# Patient Record
Sex: FEMALE | Race: BLACK OR AFRICAN AMERICAN | Employment: FULL TIME | URBAN - METROPOLITAN AREA
[De-identification: names, ages, dates, MRNs, and addresses within clinical notes are randomized per-mention and may not be internally consistent; named-entity substitution may affect disease eponyms.]

---

## 2017-09-25 ENCOUNTER — OFFICE VISIT (OUTPATIENT)
Dept: OBGYN CLINIC | Age: 42
End: 2017-09-25
Payer: COMMERCIAL

## 2017-09-25 VITALS
DIASTOLIC BLOOD PRESSURE: 90 MMHG | BODY MASS INDEX: 39.93 KG/M2 | WEIGHT: 217 LBS | SYSTOLIC BLOOD PRESSURE: 131 MMHG | HEART RATE: 66 BPM | HEIGHT: 62 IN

## 2017-09-25 DIAGNOSIS — O09.521 AMA (ADVANCED MATERNAL AGE) MULTIGRAVIDA 35+, FIRST TRIMESTER: ICD-10-CM

## 2017-09-25 DIAGNOSIS — N91.2 AMENORRHEA: Primary | ICD-10-CM

## 2017-09-25 DIAGNOSIS — O09.291 HISTORY OF STILLBIRTH IN CURRENTLY PREGNANT PATIENT, FIRST TRIMESTER: ICD-10-CM

## 2017-09-25 DIAGNOSIS — Z86.32 HX OF GESTATIONAL DIABETES IN PRIOR PREGNANCY, CURRENTLY PREGNANT, FIRST TRIMESTER: ICD-10-CM

## 2017-09-25 DIAGNOSIS — Z98.891 HX OF CESAREAN SECTION: ICD-10-CM

## 2017-09-25 DIAGNOSIS — O16.9 HYPERTENSION AFFECTING PREGNANCY, UNSPECIFIED TRIMESTER: ICD-10-CM

## 2017-09-25 DIAGNOSIS — O09.291 HX OF GESTATIONAL DIABETES IN PRIOR PREGNANCY, CURRENTLY PREGNANT, FIRST TRIMESTER: ICD-10-CM

## 2017-09-25 PROBLEM — O09.299 HX OF GESTATIONAL DIABETES IN PRIOR PREGNANCY, CURRENTLY PREGNANT: Status: ACTIVE | Noted: 2017-09-25

## 2017-09-25 PROBLEM — O09.299 HISTORY OF STILLBIRTH IN CURRENTLY PREGNANT PATIENT: Status: ACTIVE | Noted: 2017-09-25

## 2017-09-25 LAB
CONTROL: POSITIVE
PREGNANCY TEST URINE, POC: POSITIVE

## 2017-09-25 PROCEDURE — 99213 OFFICE O/P EST LOW 20 MIN: CPT | Performed by: ADVANCED PRACTICE MIDWIFE

## 2017-09-25 PROCEDURE — 81025 URINE PREGNANCY TEST: CPT | Performed by: ADVANCED PRACTICE MIDWIFE

## 2017-09-25 RX ORDER — BISOPROLOL FUMARATE AND HYDROCHLOROTHIAZIDE 10; 6.25 MG/1; MG/1
1 TABLET ORAL DAILY
COMMUNITY
Start: 2017-08-26

## 2017-09-25 RX ORDER — LOSARTAN POTASSIUM AND HYDROCHLOROTHIAZIDE 12.5; 1 MG/1; MG/1
1 TABLET ORAL DAILY
COMMUNITY
Start: 2017-08-26

## 2017-09-28 ENCOUNTER — TELEPHONE (OUTPATIENT)
Dept: OBGYN CLINIC | Age: 42
End: 2017-09-28

## 2017-09-28 ENCOUNTER — HOSPITAL ENCOUNTER (OUTPATIENT)
Dept: ULTRASOUND IMAGING | Age: 42
Discharge: HOME | End: 2017-09-28
Payer: COMMERCIAL

## 2017-09-28 DIAGNOSIS — O20.9 BLEEDING IN EARLY PREGNANCY: Primary | ICD-10-CM

## 2017-09-28 DIAGNOSIS — O20.9 BLEEDING IN EARLY PREGNANCY: ICD-10-CM

## 2017-09-28 PROCEDURE — 76801 OB US < 14 WKS SINGLE FETUS: CPT

## 2017-09-28 PROCEDURE — 76817 TRANSVAGINAL US OBSTETRIC: CPT

## 2017-10-05 ENCOUNTER — OFFICE VISIT (OUTPATIENT)
Dept: OBGYN CLINIC | Age: 42
End: 2017-10-05
Payer: COMMERCIAL

## 2017-10-05 ENCOUNTER — HOSPITAL ENCOUNTER (OUTPATIENT)
Age: 42
Setting detail: SPECIMEN
Discharge: HOME | End: 2017-10-05
Payer: COMMERCIAL

## 2017-10-05 ENCOUNTER — HOSPITAL ENCOUNTER (OUTPATIENT)
Dept: ULTRASOUND IMAGING | Facility: CLINIC | Age: 42
Discharge: HOME | End: 2017-10-05
Payer: COMMERCIAL

## 2017-10-05 VITALS — SYSTOLIC BLOOD PRESSURE: 102 MMHG | DIASTOLIC BLOOD PRESSURE: 56 MMHG | HEART RATE: 67 BPM

## 2017-10-05 DIAGNOSIS — O03.9 SAB (SPONTANEOUS ABORTION): ICD-10-CM

## 2017-10-05 DIAGNOSIS — O03.9 MISCARRIAGE: Primary | ICD-10-CM

## 2017-10-05 DIAGNOSIS — O03.9 SAB (SPONTANEOUS ABORTION): Primary | ICD-10-CM

## 2017-10-05 LAB
ABO/RH: NORMAL
ANTIBODY SCREEN: NEGATIVE
HCG QUANTITATIVE: 156 IU/L

## 2017-10-05 PROCEDURE — 99213 OFFICE O/P EST LOW 20 MIN: CPT | Performed by: OBSTETRICS & GYNECOLOGY

## 2017-10-05 PROCEDURE — 76856 US EXAM PELVIC COMPLETE: CPT

## 2017-10-05 PROCEDURE — 76830 TRANSVAGINAL US NON-OB: CPT

## 2017-10-12 ENCOUNTER — HOSPITAL ENCOUNTER (OUTPATIENT)
Age: 42
Setting detail: SPECIMEN
Discharge: HOME | End: 2017-10-12
Payer: COMMERCIAL

## 2017-10-12 DIAGNOSIS — O03.9 MISCARRIAGE: ICD-10-CM

## 2017-10-12 LAB — HCG QUANTITATIVE: 7 IU/L

## 2019-12-06 ENCOUNTER — HOSPITAL ENCOUNTER (OUTPATIENT)
Dept: ULTRASOUND IMAGING | Age: 44
Discharge: HOME | End: 2019-12-08
Payer: COMMERCIAL

## 2019-12-06 DIAGNOSIS — N92.6 IRREGULAR MENSES: ICD-10-CM

## 2019-12-06 PROCEDURE — 76830 TRANSVAGINAL US NON-OB: CPT

## 2019-12-06 PROCEDURE — 76856 US EXAM PELVIC COMPLETE: CPT

## 2019-12-16 ENCOUNTER — OFFICE VISIT (OUTPATIENT)
Dept: OBGYN CLINIC | Age: 44
End: 2019-12-16
Payer: COMMERCIAL

## 2019-12-16 VITALS
WEIGHT: 217 LBS | DIASTOLIC BLOOD PRESSURE: 82 MMHG | BODY MASS INDEX: 39.93 KG/M2 | HEIGHT: 62 IN | SYSTOLIC BLOOD PRESSURE: 132 MMHG

## 2019-12-16 DIAGNOSIS — N93.9 ABNORMAL UTERINE BLEEDING: Primary | ICD-10-CM

## 2019-12-16 PROCEDURE — G8417 CALC BMI ABV UP PARAM F/U: HCPCS | Performed by: OBSTETRICS & GYNECOLOGY

## 2019-12-16 PROCEDURE — G8428 CUR MEDS NOT DOCUMENT: HCPCS | Performed by: OBSTETRICS & GYNECOLOGY

## 2019-12-16 PROCEDURE — 99214 OFFICE O/P EST MOD 30 MIN: CPT | Performed by: OBSTETRICS & GYNECOLOGY

## 2019-12-16 PROCEDURE — 1036F TOBACCO NON-USER: CPT | Performed by: OBSTETRICS & GYNECOLOGY

## 2019-12-16 PROCEDURE — G8484 FLU IMMUNIZE NO ADMIN: HCPCS | Performed by: OBSTETRICS & GYNECOLOGY

## 2019-12-16 RX ORDER — DICLOFENAC SODIUM 75 MG/1
TABLET, DELAYED RELEASE ORAL
COMMUNITY
End: 2019-12-20 | Stop reason: ALTCHOICE

## 2019-12-27 ENCOUNTER — ANESTHESIA (OUTPATIENT)
Dept: OPERATING ROOM | Age: 44
End: 2019-12-27
Payer: COMMERCIAL

## 2019-12-27 ENCOUNTER — ANESTHESIA EVENT (OUTPATIENT)
Dept: OPERATING ROOM | Age: 44
End: 2019-12-27
Payer: COMMERCIAL

## 2019-12-27 ENCOUNTER — HOSPITAL ENCOUNTER (OUTPATIENT)
Age: 44
Setting detail: OUTPATIENT SURGERY
Discharge: HOME | End: 2019-12-27
Attending: OBSTETRICS & GYNECOLOGY | Admitting: OBSTETRICS & GYNECOLOGY
Payer: COMMERCIAL

## 2019-12-27 VITALS
SYSTOLIC BLOOD PRESSURE: 131 MMHG | OXYGEN SATURATION: 97 % | HEART RATE: 73 BPM | RESPIRATION RATE: 14 BRPM | DIASTOLIC BLOOD PRESSURE: 83 MMHG | BODY MASS INDEX: 41.3 KG/M2 | HEIGHT: 62 IN | WEIGHT: 224.43 LBS | TEMPERATURE: 97.3 F

## 2019-12-27 VITALS — SYSTOLIC BLOOD PRESSURE: 119 MMHG | DIASTOLIC BLOOD PRESSURE: 103 MMHG | TEMPERATURE: 97.3 F | OXYGEN SATURATION: 97 %

## 2019-12-27 LAB
ABO/RH: NORMAL
ANION GAP SERPL CALCULATED.3IONS-SCNC: 15 MMOL/L (ref 9–17)
ANTIBODY SCREEN: NEGATIVE
ARM BAND NUMBER: NORMAL
BUN BLDV-MCNC: 17 MG/DL (ref 6–20)
BUN/CREAT BLD: ABNORMAL (ref 9–20)
CALCIUM SERPL-MCNC: 9 MG/DL (ref 8.6–10.4)
CHLORIDE BLD-SCNC: 103 MMOL/L (ref 98–107)
CO2: 19 MMOL/L (ref 20–31)
CREAT SERPL-MCNC: 0.71 MG/DL (ref 0.5–0.9)
EXPIRATION DATE: NORMAL
GFR AFRICAN AMERICAN: >60 ML/MIN
GFR NON-AFRICAN AMERICAN: >60 ML/MIN
GFR SERPL CREATININE-BSD FRML MDRD: ABNORMAL ML/MIN/{1.73_M2}
GFR SERPL CREATININE-BSD FRML MDRD: ABNORMAL ML/MIN/{1.73_M2}
GLUCOSE BLD-MCNC: 125 MG/DL (ref 70–99)
HCG QUALITATIVE: NEGATIVE
HCG, PREGNANCY URINE (POC): NEGATIVE
HCT VFR BLD CALC: 36.8 % (ref 36.3–47.1)
HEMOGLOBIN: 11.7 G/DL (ref 11.9–15.1)
MCH RBC QN AUTO: 29.6 PG (ref 25.2–33.5)
MCHC RBC AUTO-ENTMCNC: 31.8 G/DL (ref 28.4–34.8)
MCV RBC AUTO: 93.2 FL (ref 82.6–102.9)
NRBC AUTOMATED: 0 PER 100 WBC
PDW BLD-RTO: 17.2 % (ref 11.8–14.4)
PLATELET # BLD: 438 K/UL (ref 138–453)
PMV BLD AUTO: 9.3 FL (ref 8.1–13.5)
POTASSIUM SERPL-SCNC: 3.7 MMOL/L (ref 3.7–5.3)
RBC # BLD: 3.95 M/UL (ref 3.95–5.11)
SODIUM BLD-SCNC: 137 MMOL/L (ref 135–144)
WBC # BLD: 10.6 K/UL (ref 3.5–11.3)

## 2019-12-27 PROCEDURE — 7100000000 HC PACU RECOVERY - FIRST 15 MIN: Performed by: OBSTETRICS & GYNECOLOGY

## 2019-12-27 PROCEDURE — 86901 BLOOD TYPING SEROLOGIC RH(D): CPT

## 2019-12-27 PROCEDURE — 2709999900 HC NON-CHARGEABLE SUPPLY: Performed by: OBSTETRICS & GYNECOLOGY

## 2019-12-27 PROCEDURE — 80048 BASIC METABOLIC PNL TOTAL CA: CPT

## 2019-12-27 PROCEDURE — 2500000003 HC RX 250 WO HCPCS: Performed by: NURSE ANESTHETIST, CERTIFIED REGISTERED

## 2019-12-27 PROCEDURE — 6360000002 HC RX W HCPCS: Performed by: NURSE ANESTHETIST, CERTIFIED REGISTERED

## 2019-12-27 PROCEDURE — 2580000003 HC RX 258: Performed by: NURSE ANESTHETIST, CERTIFIED REGISTERED

## 2019-12-27 PROCEDURE — 3700000000 HC ANESTHESIA ATTENDED CARE: Performed by: OBSTETRICS & GYNECOLOGY

## 2019-12-27 PROCEDURE — 84703 CHORIONIC GONADOTROPIN ASSAY: CPT

## 2019-12-27 PROCEDURE — 86900 BLOOD TYPING SEROLOGIC ABO: CPT

## 2019-12-27 PROCEDURE — 2580000003 HC RX 258: Performed by: ANESTHESIOLOGY

## 2019-12-27 PROCEDURE — 3600000004 HC SURGERY LEVEL 4 BASE: Performed by: OBSTETRICS & GYNECOLOGY

## 2019-12-27 PROCEDURE — 81025 URINE PREGNANCY TEST: CPT

## 2019-12-27 PROCEDURE — 86850 RBC ANTIBODY SCREEN: CPT

## 2019-12-27 PROCEDURE — 6360000002 HC RX W HCPCS: Performed by: ANESTHESIOLOGY

## 2019-12-27 PROCEDURE — 2720000010 HC SURG SUPPLY STERILE: Performed by: OBSTETRICS & GYNECOLOGY

## 2019-12-27 PROCEDURE — 88305 TISSUE EXAM BY PATHOLOGIST: CPT

## 2019-12-27 PROCEDURE — 7100000010 HC PHASE II RECOVERY - FIRST 15 MIN: Performed by: OBSTETRICS & GYNECOLOGY

## 2019-12-27 PROCEDURE — 93005 ELECTROCARDIOGRAM TRACING: CPT | Performed by: ANESTHESIOLOGY

## 2019-12-27 PROCEDURE — 7100000011 HC PHASE II RECOVERY - ADDTL 15 MIN: Performed by: OBSTETRICS & GYNECOLOGY

## 2019-12-27 PROCEDURE — 2580000003 HC RX 258: Performed by: OBSTETRICS & GYNECOLOGY

## 2019-12-27 PROCEDURE — 58563 HYSTEROSCOPY ABLATION: CPT | Performed by: OBSTETRICS & GYNECOLOGY

## 2019-12-27 PROCEDURE — 3700000001 HC ADD 15 MINUTES (ANESTHESIA): Performed by: OBSTETRICS & GYNECOLOGY

## 2019-12-27 PROCEDURE — 85027 COMPLETE CBC AUTOMATED: CPT

## 2019-12-27 PROCEDURE — 3600000014 HC SURGERY LEVEL 4 ADDTL 15MIN: Performed by: OBSTETRICS & GYNECOLOGY

## 2019-12-27 PROCEDURE — 7100000001 HC PACU RECOVERY - ADDTL 15 MIN: Performed by: OBSTETRICS & GYNECOLOGY

## 2019-12-27 RX ORDER — MAGNESIUM HYDROXIDE 1200 MG/15ML
LIQUID ORAL CONTINUOUS PRN
Status: COMPLETED | OUTPATIENT
Start: 2019-12-27 | End: 2019-12-27

## 2019-12-27 RX ORDER — ONDANSETRON 2 MG/ML
INJECTION INTRAMUSCULAR; INTRAVENOUS PRN
Status: DISCONTINUED | OUTPATIENT
Start: 2019-12-27 | End: 2019-12-27 | Stop reason: SDUPTHER

## 2019-12-27 RX ORDER — MIDAZOLAM HYDROCHLORIDE 1 MG/ML
2 INJECTION INTRAMUSCULAR; INTRAVENOUS
Status: DISCONTINUED | OUTPATIENT
Start: 2019-12-27 | End: 2019-12-27 | Stop reason: HOSPADM

## 2019-12-27 RX ORDER — ONDANSETRON 2 MG/ML
4 INJECTION INTRAMUSCULAR; INTRAVENOUS
Status: DISCONTINUED | OUTPATIENT
Start: 2019-12-27 | End: 2019-12-27 | Stop reason: HOSPADM

## 2019-12-27 RX ORDER — HYDROCODONE BITARTRATE AND ACETAMINOPHEN 5; 325 MG/1; MG/1
2 TABLET ORAL PRN
Status: DISCONTINUED | OUTPATIENT
Start: 2019-12-27 | End: 2019-12-27 | Stop reason: HOSPADM

## 2019-12-27 RX ORDER — MIDAZOLAM HYDROCHLORIDE 1 MG/ML
INJECTION INTRAMUSCULAR; INTRAVENOUS PRN
Status: DISCONTINUED | OUTPATIENT
Start: 2019-12-27 | End: 2019-12-27 | Stop reason: SDUPTHER

## 2019-12-27 RX ORDER — PROPOFOL 10 MG/ML
INJECTION, EMULSION INTRAVENOUS PRN
Status: DISCONTINUED | OUTPATIENT
Start: 2019-12-27 | End: 2019-12-27 | Stop reason: SDUPTHER

## 2019-12-27 RX ORDER — FENTANYL CITRATE 50 UG/ML
25 INJECTION, SOLUTION INTRAMUSCULAR; INTRAVENOUS EVERY 5 MIN PRN
Status: DISCONTINUED | OUTPATIENT
Start: 2019-12-27 | End: 2019-12-27 | Stop reason: HOSPADM

## 2019-12-27 RX ORDER — DEXAMETHASONE SODIUM PHOSPHATE 10 MG/ML
INJECTION INTRAMUSCULAR; INTRAVENOUS PRN
Status: DISCONTINUED | OUTPATIENT
Start: 2019-12-27 | End: 2019-12-27 | Stop reason: SDUPTHER

## 2019-12-27 RX ORDER — FENTANYL CITRATE 50 UG/ML
INJECTION, SOLUTION INTRAMUSCULAR; INTRAVENOUS PRN
Status: DISCONTINUED | OUTPATIENT
Start: 2019-12-27 | End: 2019-12-27 | Stop reason: SDUPTHER

## 2019-12-27 RX ORDER — IBUPROFEN 800 MG/1
800 TABLET ORAL EVERY 8 HOURS PRN
Qty: 60 TABLET | Refills: 0 | Status: SHIPPED | OUTPATIENT
Start: 2019-12-27

## 2019-12-27 RX ORDER — DIPHENHYDRAMINE HYDROCHLORIDE 50 MG/ML
INJECTION INTRAMUSCULAR; INTRAVENOUS PRN
Status: DISCONTINUED | OUTPATIENT
Start: 2019-12-27 | End: 2019-12-27 | Stop reason: SDUPTHER

## 2019-12-27 RX ORDER — LIDOCAINE HYDROCHLORIDE 10 MG/ML
INJECTION, SOLUTION EPIDURAL; INFILTRATION; INTRACAUDAL; PERINEURAL PRN
Status: DISCONTINUED | OUTPATIENT
Start: 2019-12-27 | End: 2019-12-27 | Stop reason: SDUPTHER

## 2019-12-27 RX ORDER — KETOROLAC TROMETHAMINE 30 MG/ML
INJECTION, SOLUTION INTRAMUSCULAR; INTRAVENOUS PRN
Status: DISCONTINUED | OUTPATIENT
Start: 2019-12-27 | End: 2019-12-27 | Stop reason: SDUPTHER

## 2019-12-27 RX ORDER — LIDOCAINE HYDROCHLORIDE 10 MG/ML
1 INJECTION, SOLUTION EPIDURAL; INFILTRATION; INTRACAUDAL; PERINEURAL
Status: DISCONTINUED | OUTPATIENT
Start: 2019-12-27 | End: 2019-12-27 | Stop reason: HOSPADM

## 2019-12-27 RX ORDER — SODIUM CHLORIDE, SODIUM LACTATE, POTASSIUM CHLORIDE, CALCIUM CHLORIDE 600; 310; 30; 20 MG/100ML; MG/100ML; MG/100ML; MG/100ML
INJECTION, SOLUTION INTRAVENOUS CONTINUOUS PRN
Status: DISCONTINUED | OUTPATIENT
Start: 2019-12-27 | End: 2019-12-27 | Stop reason: SDUPTHER

## 2019-12-27 RX ORDER — LABETALOL HYDROCHLORIDE 5 MG/ML
5 INJECTION, SOLUTION INTRAVENOUS EVERY 10 MIN PRN
Status: DISCONTINUED | OUTPATIENT
Start: 2019-12-27 | End: 2019-12-27 | Stop reason: HOSPADM

## 2019-12-27 RX ORDER — HYDROCODONE BITARTRATE AND ACETAMINOPHEN 5; 325 MG/1; MG/1
1 TABLET ORAL PRN
Status: DISCONTINUED | OUTPATIENT
Start: 2019-12-27 | End: 2019-12-27 | Stop reason: HOSPADM

## 2019-12-27 RX ORDER — SODIUM CHLORIDE, SODIUM LACTATE, POTASSIUM CHLORIDE, CALCIUM CHLORIDE 600; 310; 30; 20 MG/100ML; MG/100ML; MG/100ML; MG/100ML
INJECTION, SOLUTION INTRAVENOUS CONTINUOUS
Status: DISCONTINUED | OUTPATIENT
Start: 2019-12-27 | End: 2019-12-27 | Stop reason: HOSPADM

## 2019-12-27 RX ADMIN — FENTANYL CITRATE 25 MCG: 50 INJECTION INTRAMUSCULAR; INTRAVENOUS at 10:13

## 2019-12-27 RX ADMIN — FENTANYL CITRATE 25 MCG: 50 INJECTION INTRAMUSCULAR; INTRAVENOUS at 10:16

## 2019-12-27 RX ADMIN — FENTANYL CITRATE 25 MCG: 50 INJECTION, SOLUTION INTRAMUSCULAR; INTRAVENOUS at 11:12

## 2019-12-27 RX ADMIN — Medication 12.5 MG: at 10:15

## 2019-12-27 RX ADMIN — LIDOCAINE HYDROCHLORIDE 50 MG: 10 INJECTION, SOLUTION EPIDURAL; INFILTRATION; INTRACAUDAL; PERINEURAL at 10:07

## 2019-12-27 RX ADMIN — ONDANSETRON 4 MG: 2 INJECTION, SOLUTION INTRAMUSCULAR; INTRAVENOUS at 10:36

## 2019-12-27 RX ADMIN — DEXAMETHASONE SODIUM PHOSPHATE 10 MG: 10 INJECTION INTRAMUSCULAR; INTRAVENOUS at 10:15

## 2019-12-27 RX ADMIN — KETOROLAC TROMETHAMINE 30 MG: 30 INJECTION, SOLUTION INTRAMUSCULAR; INTRAVENOUS at 10:36

## 2019-12-27 RX ADMIN — FENTANYL CITRATE 25 MCG: 50 INJECTION INTRAMUSCULAR; INTRAVENOUS at 10:18

## 2019-12-27 RX ADMIN — FENTANYL CITRATE 25 MCG: 50 INJECTION INTRAMUSCULAR; INTRAVENOUS at 10:11

## 2019-12-27 RX ADMIN — FENTANYL CITRATE 25 MCG: 50 INJECTION, SOLUTION INTRAMUSCULAR; INTRAVENOUS at 11:17

## 2019-12-27 RX ADMIN — SODIUM CHLORIDE, POTASSIUM CHLORIDE, SODIUM LACTATE AND CALCIUM CHLORIDE: 600; 310; 30; 20 INJECTION, SOLUTION INTRAVENOUS at 09:15

## 2019-12-27 RX ADMIN — PROPOFOL 200 MG: 10 INJECTION, EMULSION INTRAVENOUS at 10:07

## 2019-12-27 RX ADMIN — MIDAZOLAM HYDROCHLORIDE 2 MG: 1 INJECTION, SOLUTION INTRAMUSCULAR; INTRAVENOUS at 10:03

## 2019-12-27 RX ADMIN — SODIUM CHLORIDE, POTASSIUM CHLORIDE, SODIUM LACTATE AND CALCIUM CHLORIDE: 600; 310; 30; 20 INJECTION, SOLUTION INTRAVENOUS at 09:58

## 2019-12-27 ASSESSMENT — PULMONARY FUNCTION TESTS
PIF_VALUE: 1
PIF_VALUE: 23
PIF_VALUE: 0
PIF_VALUE: 24
PIF_VALUE: 20
PIF_VALUE: 24
PIF_VALUE: 23
PIF_VALUE: 24
PIF_VALUE: 23
PIF_VALUE: 1
PIF_VALUE: 23
PIF_VALUE: 20
PIF_VALUE: 21
PIF_VALUE: 23
PIF_VALUE: 3
PIF_VALUE: 23
PIF_VALUE: 5
PIF_VALUE: 24
PIF_VALUE: 22
PIF_VALUE: 23
PIF_VALUE: 23
PIF_VALUE: 17
PIF_VALUE: 23
PIF_VALUE: 23
PIF_VALUE: 7
PIF_VALUE: 21
PIF_VALUE: 22
PIF_VALUE: 24
PIF_VALUE: 21
PIF_VALUE: 21
PIF_VALUE: 0
PIF_VALUE: 3
PIF_VALUE: 23
PIF_VALUE: 21
PIF_VALUE: 1
PIF_VALUE: 24
PIF_VALUE: 21
PIF_VALUE: 13
PIF_VALUE: 21
PIF_VALUE: 23
PIF_VALUE: 8
PIF_VALUE: 2
PIF_VALUE: 16
PIF_VALUE: 23
PIF_VALUE: 1
PIF_VALUE: 1

## 2019-12-27 ASSESSMENT — PAIN SCALES - GENERAL
PAINLEVEL_OUTOF10: 2
PAINLEVEL_OUTOF10: 6
PAINLEVEL_OUTOF10: 7
PAINLEVEL_OUTOF10: 4
PAINLEVEL_OUTOF10: 2
PAINLEVEL_OUTOF10: 2

## 2019-12-27 ASSESSMENT — PAIN DESCRIPTION - PAIN TYPE: TYPE: SURGICAL PAIN

## 2019-12-27 ASSESSMENT — PAIN DESCRIPTION - LOCATION: LOCATION: ABDOMEN

## 2019-12-27 ASSESSMENT — PAIN DESCRIPTION - ORIENTATION: ORIENTATION: LOWER

## 2019-12-27 ASSESSMENT — PAIN DESCRIPTION - DESCRIPTORS: DESCRIPTORS: CRAMPING

## 2019-12-27 ASSESSMENT — PAIN - FUNCTIONAL ASSESSMENT: PAIN_FUNCTIONAL_ASSESSMENT: 0-10

## 2019-12-27 NOTE — H&P
necessity for a second surgery and that there may be an incomplete removal of abnormal tissue.     Dorothea Hassan DO  Ob/Gyn Resident  Pager: [phone]  Parminder Correia Se  12/27/2019 9:36 AM

## 2019-12-27 NOTE — H&P
HPI :   Clint Villegas is a 38 y.o. femaleGRAVIDA 6 PARA 2042     Clint Villegas was seen in the office as a referral from her PCP with complaints of abnormal bleeding for the past 6 months. She stated that bleeding varies in amount and are associated with moderately severe cramping. She does take NSAIDs for this. Prior to this she was having regular menstrual cycles. Her PCP did order a pelvic ultrasound. She is having regular bowel movements without constipation or diarrhea. She denies any involuntary loss of urine.       EXAMINATION:   PELVIC ULTRASOUND       12/6/2019       TECHNIQUE:   Transabdominal and transvaginal pelvic ultrasound was performed with color   doppler flow evaluation without spectral waveform analysis.       COMPARISON:   10/05/2017       HISTORY:   ORDERING SYSTEM PROVIDED HISTORY: Irregular menses       41-year-old female with irregular menses       FINDINGS:       Measurements:       Uterus:  9.0 x 4.9 x 5.0 cm       Endometrial stripe:  2.5 cm       Right Ovary:  3.0 x 1.9 x 1.3 cm       Left Ovary:  3.1 x 3.0 x 2.3 cm           Ultrasound Findings:       Patient's LMP is 11/26/2019.       Uterus: Heterogeneous echogenicity of the uterus.       Endometrial stripe: Endometrial stripe is abnormally enlarged/thickened. Nabothian cysts in the cervix.       Right Ovary: Right ovary is within normal limits.       Left Ovary:  Left ovary is within normal limits.       Bilateral ovarian follicles.       Free Fluid: No evidence of free fluid.           Impression   1. Abnormal thickening of the endometrial stripe thickness measuring 2.5 cm. Differential considerations include endometrial polyps, endometrial   hyperplasia or endometrial carcinoma.  Consider further evaluation with   endometrial sampling if clinically indicated. 2. Bilateral ovarian follicles.    3. Nabothian cysts in the cervix.         _____________________________________________________________________       Past Medical History:   Diagnosis Date    Hypertension                                                                           Past Surgical History:   Procedure Laterality Date    APPENDECTOMY         SECTION        DILATION AND CURETTAGE                Family History   Problem Relation Age of Onset    Cancer Paternal Grandmother      Hypertension Maternal Grandmother      Heart Disease Maternal Grandmother      Hypertension Mother      Heart Disease Mother        Social History           Tobacco Use   Smoking Status Former Smoker    Packs/day: 1.00    Years: 17.00    Pack years: 17.00    Types: Cigarettes   Smokeless Tobacco Never Used      Social History           Substance and Sexual Activity   Alcohol Use No     Comment: not currently             Current Outpatient Medications   Medication Sig Dispense Refill    diclofenac (VOLTAREN) 75 MG EC tablet 1 tablet        losartan-hydrochlorothiazide (HYZAAR) 100-12.5 MG per tablet Take 1 tablet by mouth daily        bisoprolol-hydrochlorothiazide (ZIAC) 10-6.25 MG per tablet Take 1 tablet by mouth daily        Prenatal Vit-Fe Fumarate-FA (PRENATAL MULTIVITAMIN/IRON PO) Take 1 tablet by mouth daily          No current facility-administered medications for this visit.          Allergies: Allergies   Allergen Reactions    Other Rash       There is a medication/antibiotic that the patient took \"a long time ago\", but she cannot remember what it was or what it was prescribed for. Gave her a rash on her face         Gynecologic History:  Patient's last menstrual period was 2019.   Sexually Active: Yes  STD History: Yes, CT/trichomonas  Abnormal Pap History no  Birth Control: No              OB History    Para Term  AB Living   6 3 0 0 1 2   SAB TAB Ectopic Molar Multiple Live Births    0 0 0 0 0 2   Obstetric Comments   Still born birth      ______________________________________________________________________  REVIEW OF has been signed for a hysteroscopy, D&C and endometrial ablation   . Preop labs will include a CBC, type & screen, HCG and BMP. We will not require antibiotic prophylaxis and will use SCDs for VTE prophylaxis. She was instructed not to use NSAIDs regularly within 4-5 days of her planned surgery. She will plan on returning to the office in 1-2 weeks postop.     Danielle Perla MD, MPH, [facility]  [email or web address]

## 2019-12-28 LAB
EKG ATRIAL RATE: 78 BPM
EKG P AXIS: 45 DEGREES
EKG P-R INTERVAL: 148 MS
EKG Q-T INTERVAL: 400 MS
EKG QRS DURATION: 82 MS
EKG QTC CALCULATION (BAZETT): 456 MS
EKG R AXIS: 22 DEGREES
EKG T AXIS: 12 DEGREES
EKG VENTRICULAR RATE: 78 BPM

## 2019-12-30 LAB — SURGICAL PATHOLOGY REPORT: NORMAL

## 2020-01-06 ENCOUNTER — OFFICE VISIT (OUTPATIENT)
Dept: OBGYN CLINIC | Age: 45
End: 2020-01-06
Payer: COMMERCIAL

## 2020-01-06 VITALS
SYSTOLIC BLOOD PRESSURE: 118 MMHG | DIASTOLIC BLOOD PRESSURE: 72 MMHG | HEIGHT: 62 IN | BODY MASS INDEX: 42.69 KG/M2 | WEIGHT: 232 LBS

## 2020-01-06 PROCEDURE — G8417 CALC BMI ABV UP PARAM F/U: HCPCS | Performed by: OBSTETRICS & GYNECOLOGY

## 2020-01-06 PROCEDURE — 4004F PT TOBACCO SCREEN RCVD TLK: CPT | Performed by: OBSTETRICS & GYNECOLOGY

## 2020-01-06 PROCEDURE — G8427 DOCREV CUR MEDS BY ELIG CLIN: HCPCS | Performed by: OBSTETRICS & GYNECOLOGY

## 2020-01-06 PROCEDURE — 99213 OFFICE O/P EST LOW 20 MIN: CPT | Performed by: OBSTETRICS & GYNECOLOGY

## 2020-01-06 PROCEDURE — G8484 FLU IMMUNIZE NO ADMIN: HCPCS | Performed by: OBSTETRICS & GYNECOLOGY

## 2020-09-10 ENCOUNTER — HOSPITAL ENCOUNTER (OUTPATIENT)
Facility: CLINIC | Age: 45
Discharge: HOME | End: 2020-09-10
Payer: COMMERCIAL

## 2020-09-10 LAB
CHOLESTEROL/HDL RATIO: 4.5
CHOLESTEROL: 183 MG/DL
HDLC SERPL-MCNC: 41 MG/DL
LDL CHOLESTEROL: 92 MG/DL (ref 0–130)
TRIGL SERPL-MCNC: 250 MG/DL
VLDLC SERPL CALC-MCNC: ABNORMAL MG/DL (ref 1–30)

## 2020-09-10 PROCEDURE — 36415 COLL VENOUS BLD VENIPUNCTURE: CPT

## 2020-09-10 PROCEDURE — 80061 LIPID PANEL: CPT

## 2021-04-13 ENCOUNTER — HOSPITAL ENCOUNTER (OUTPATIENT)
Facility: CLINIC | Age: 46
Discharge: HOME | End: 2021-04-13
Payer: COMMERCIAL

## 2021-04-13 LAB
CHOLESTEROL, FASTING: 187 MG/DL
CHOLESTEROL/HDL RATIO: 3.8
HDLC SERPL-MCNC: 49 MG/DL
LDL CHOLESTEROL: 98 MG/DL (ref 0–130)
TRIGLYCERIDE, FASTING: 202 MG/DL
VLDLC SERPL CALC-MCNC: ABNORMAL MG/DL (ref 1–30)

## 2021-04-13 PROCEDURE — 36415 COLL VENOUS BLD VENIPUNCTURE: CPT

## 2021-04-13 PROCEDURE — 80061 LIPID PANEL: CPT

## 2021-04-27 ENCOUNTER — OFFICE VISIT (OUTPATIENT)
Dept: OBGYN CLINIC | Age: 46
End: 2021-04-27
Payer: COMMERCIAL

## 2021-04-27 ENCOUNTER — HOSPITAL ENCOUNTER (OUTPATIENT)
Age: 46
Setting detail: SPECIMEN
Discharge: HOME | End: 2021-04-27
Payer: COMMERCIAL

## 2021-04-27 VITALS
WEIGHT: 228 LBS | SYSTOLIC BLOOD PRESSURE: 140 MMHG | HEIGHT: 62 IN | BODY MASS INDEX: 41.96 KG/M2 | DIASTOLIC BLOOD PRESSURE: 78 MMHG

## 2021-04-27 DIAGNOSIS — Z01.419 ENCOUNTER FOR GYNECOLOGICAL EXAMINATION WITHOUT ABNORMAL FINDING: Primary | ICD-10-CM

## 2021-04-27 DIAGNOSIS — Z12.31 VISIT FOR SCREENING MAMMOGRAM: ICD-10-CM

## 2021-04-27 PROCEDURE — G8427 DOCREV CUR MEDS BY ELIG CLIN: HCPCS | Performed by: OBSTETRICS & GYNECOLOGY

## 2021-04-27 PROCEDURE — 4004F PT TOBACCO SCREEN RCVD TLK: CPT | Performed by: OBSTETRICS & GYNECOLOGY

## 2021-04-27 PROCEDURE — G8417 CALC BMI ABV UP PARAM F/U: HCPCS | Performed by: OBSTETRICS & GYNECOLOGY

## 2021-04-27 PROCEDURE — 99396 PREV VISIT EST AGE 40-64: CPT | Performed by: OBSTETRICS & GYNECOLOGY

## 2021-04-27 NOTE — PROGRESS NOTES
DATE OF VISIT:  21        History and Physical    Eliel Glez    :  1975  CHIEF COMPLAINT:    Chief Complaint   Patient presents with    Annual Exam     Here for annual Last pap 19neg Last mamogram 20 neg Had Novasure                     HPI :   Eliel Glez is a 41 y.o. femaleGRAVIDA 6 PARA     Eliel Glez returns today for her annual exam.  Since her ablation she has not been experiencing any vaginal bleeding. She denies any vasomotor symptoms. She sees Dr. Betzaida Zaragoza as her PCP however she will be retiring this spring. She is having regular bowel movements without constipation or diarrhea. She denies any UTI symptoms or involuntary loss of urine. She is otherwise without any significant complaints today. Pavan and her  are planning on getting the Covid vaccination.   _____________________________________________________________________  Past Medical History:   Diagnosis Date    Asthma     exercise induced- not taking inhalers currently    Hypertension     Sleep apnea     diagnosed by dentist does not use a machine    Wellness examination     Dr. Radha Benoit                                                                   Past Surgical History:   Procedure Laterality Date    APPENDECTOMY      [address] AND CURETTAGE OF UTERUS  2019    [address] [facility] [address] AND CURETTAGE OF UTERUS N/A 2019    NOVASURE- DILATATION AND CURETTAGE HYSTEROSCOPY performed by Pj Barr MD at [address] [place] [address] Drive HYSTEROSCOPY  2019     Family History   Problem Relation Age of Onset    Cancer Paternal Grandmother     Hypertension Maternal Grandmother     Heart Disease Maternal Grandmother     Hypertension Mother     Heart Disease Mother     Other Father         suicide    High Blood Pressure Sister     Arthritis Sister     High Blood Pressure Maternal Aunt     High Blood Pressure Maternal Uncle     Stroke Paternal Uncle      Social History     Tobacco Use   Smoking Status Current Every Day Smoker    Packs/day: 0.50    Years: 17.00    Pack years: 8.50    Types: Cigarettes   Smokeless Tobacco Never Used     Social History     Substance and Sexual Activity   Alcohol Use No    Comment: social     Current Outpatient Medications   Medication Sig Dispense Refill    losartan-hydrochlorothiazide (HYZAAR) 100-12.5 MG per tablet Take 1 tablet by mouth daily      bisoprolol-hydrochlorothiazide (ZIAC) 10-6.25 MG per tablet Take 1 tablet by mouth daily      ibuprofen (ADVIL;MOTRIN) 800 MG tablet Take 1 tablet by mouth every 8 hours as needed for Pain or Fever (Patient not taking: Reported on 2021) 60 tablet 0     No current facility-administered medications for this visit. Allergies: Allergies   Allergen Reactions    Other Rash     There is a medication/antibiotic that the patient took \"a long time ago\", but she cannot remember what it was or what it was prescribed for. Gave her a rash on her face       Gynecologic History:  No LMP recorded. Patient has had an ablation.   Sexually Active: Yes  STD History: Yes CT/trichomoniasis  Abnormal Pap History yes  Birth Control: No    OB History    Para Term  AB Living   6 3 0 0 1 2   SAB TAB Ectopic Molar Multiple Live Births   0 0 0 0 0 2   Obstetric Comments   Still born birth     ______________________________________________________________________  REVIEW OF SYSTEMS:        Constitutional:  Unexpected weight change no  Neurological:  Frequent headaches  no  Ophthalmic:  Recent visual changes no  ENT:   Difficulty swallowing  no  Breast:              Masses   no     Respiratory:  Shortness of breath  no    Cardiovascular: Chest pain   no     Gastrointestinal: Chronic diarrhea/constipation no   Urogenital:  Urinary incontinence  no                                         Heavy/irregular periods           no Vaginal discharge                   no  Hematological: Bruises easy   no     Endocrine:  Nipple Discharge  no     Hot/Cold Intolerance  no   Psychological:  Mood and affect were wnl yes                                                                                                                                           Physical Exam:    Vitals:    04/27/21 1037   BP: (!) 140/78   Site: Right Upper Arm   Position: Sitting   Cuff Size: Medium Adult   Weight: 228 lb (103.4 kg)   Height: 5' 2\" (1.575 m)       General Appearance:  She does not appear to be in any distress. This  is a well developed, well nourished, well groomed female. Neurological:  The patient is alert and oriented to time, place, person, and situation without any noted sensory motor deficits. Skin:  A brief inspection of the skin revealed no rashes or lesions. Neck:  The neck was supple. There is no tracheal deviation, thyromegaly or supraclavicular adenopathy appreciated. Breast:   The patients breasts were symmetrical.  Breasts are nontender and there  were no masses, discharge or pathologic skin changes. There is no supraclavicular or axillary adenopathy bilaterally. Respiratory: There was unlabored respiratory effort. Lungs clear to ascultation without wheezes, rales or rhonchi in all fields bilaterally. Cardiovascular:  Normal sinus rhythm with a regular rate and without murmur, rubs or gallops. Abdomen: The abdomen was soft and non-tender with no guarding, rebound, CVAT or rigidity. No hernias were appreciated. Bowel sounds were normally active. Pelvic exam:  No vulvar, vaginal or cervical lesions are noted. Normal vaginal discharge present, no significant cystocele, rectocele or enterocele noted. Uterus nongravid and without CMT and adnexa nontender and without abnormal masses bilaterally. Extremities:  FROM and nontender without clubbing cyanosis or edema. ASSESSMENT:         ICD-10-CM    1. Encounter for gynecological examination without abnormal finding  Z01.419 PAP SMEAR   2. Visit for screening mammogram  Z12.31 GORGE DIGITAL SCREEN W OR WO CAD BILATERAL                   PLAN:    Return to the office in 1 year or when necessary  Patient was seen with total face to face time of 20 minutes. Gabriella Mckeon M.D., Mph  [facility]  [place]

## 2021-04-29 LAB — CYTOLOGY REPORT: NORMAL

## 2022-01-27 DIAGNOSIS — Z12.31 VISIT FOR SCREENING MAMMOGRAM: ICD-10-CM

## 2022-04-22 NOTE — PROGRESS NOTES
The patient was seen today. She is here regarding post ab  . Her bowels are regular and she is voiding without difficulty. HPI: 42yo  who has passed tissue and clot since she was last in. Not interested in pregnancy and  is to get a vasectomy. Currently bleeding and no reporting fevers, chills, or foul odor. Past Medical History:   Diagnosis Date    Hypertension      Past Surgical History:   Procedure Laterality Date    APPENDECTOMY       SECTION      DILATION AND CURETTAGE       REVIEW OF SYSTEMS:        A minimum of an eleven point review of systems was completed and found to be negative except for the pertinent positives found below. Constitutional: No fever, chills or malaise; No weight change or fatigue  Head and Eyes: No vision, Headache, Dizziness or trauma in last 12 months  ENT ROS: No hearing, Tinnitis, sinus or taste problems  Hematological and Lymphatic ROS:No Lymphoma, Von Willebrand's, Hemophillia or Bleeding History  Psych ROS: No Depression, Homicidal thoughts,suicidal thoughts, or anxiety  Breast ROS: No prior breast abnormalities or lumps  Respiratory ROS: No SOB, Pneumoniae,Cough, or Pulmonary Embolism History  Cardiovascular ROS: No Chest Pain with Exertion, Palpitations, Syncope, Edema, Arrhythmia  Gastrointestinal ROS: No Indigestion, Heartburn, Nausea, vomiting, Diahrea, Constipation,or Bowel Changes; No Bloody Stools or melena  Genito-Urinary ROS: No Dysuria, Hematuria or Nocturia. No Urinary Incontinence or Vaginal Discharge  Musculoskeletal ROS: No Arthralgia, Arthritis,Gout,Osteoporosis or Rheumatism  Neurological ROS: No CVA, Migraines, Epilepsy, Seizure Hx, or Limb Weakness  Dermatological ROS: No Rash, Itching, Hives, Mole Changes or Cancer                                            Blood pressure (!) 102/56, pulse 67, last menstrual period 2017, unknown if currently breastfeeding. Abdomen: Soft non-tender; good bowel sounds.  No guarding, rebound
No
pcp

## 2022-09-26 ENCOUNTER — OFFICE VISIT (OUTPATIENT)
Dept: OBGYN CLINIC | Age: 47
End: 2022-09-26
Payer: COMMERCIAL

## 2022-09-26 ENCOUNTER — HOSPITAL ENCOUNTER (OUTPATIENT)
Age: 47
Setting detail: SPECIMEN
Discharge: HOME | End: 2022-09-26

## 2022-09-26 VITALS
SYSTOLIC BLOOD PRESSURE: 130 MMHG | HEIGHT: 62 IN | BODY MASS INDEX: 40.67 KG/M2 | WEIGHT: 221 LBS | DIASTOLIC BLOOD PRESSURE: 82 MMHG

## 2022-09-26 DIAGNOSIS — Z12.31 VISIT FOR SCREENING MAMMOGRAM: ICD-10-CM

## 2022-09-26 DIAGNOSIS — Z01.419 ENCOUNTER FOR GYNECOLOGICAL EXAMINATION WITHOUT ABNORMAL FINDING: Primary | ICD-10-CM

## 2022-09-26 PROCEDURE — 4004F PT TOBACCO SCREEN RCVD TLK: CPT | Performed by: OBSTETRICS & GYNECOLOGY

## 2022-09-26 PROCEDURE — G8427 DOCREV CUR MEDS BY ELIG CLIN: HCPCS | Performed by: OBSTETRICS & GYNECOLOGY

## 2022-09-26 PROCEDURE — G8417 CALC BMI ABV UP PARAM F/U: HCPCS | Performed by: OBSTETRICS & GYNECOLOGY

## 2022-09-26 PROCEDURE — 99396 PREV VISIT EST AGE 40-64: CPT | Performed by: OBSTETRICS & GYNECOLOGY

## 2022-09-26 RX ORDER — METFORMIN HYDROCHLORIDE 500 MG/1
TABLET, EXTENDED RELEASE ORAL
COMMUNITY
Start: 2022-08-03

## 2022-09-26 NOTE — PROGRESS NOTES
DATE OF VISIT:  22  History and Physical    Emily Hamilton    :  1975  CHIEF COMPLAINT:    Chief Complaint   Patient presents with    Annual Exam     Here for annual Last pap 21 neg last mammogram 22 neg                     HPI :   Emily Hamilton is a 56 y.o. femaleGRAVIDA 6 PARA                        Genevieve Delgado returns today for her annual exam.  She was recently started on metformin for prediabetes. She is having regular bowel movements without constipation or diarrhea. She denies any UTI symptoms or involuntary loss of urine. She is otherwise without any significant complaints today. She has received her COVID vacs times 2+ booster. She is due for her mammogram in January. Pavan would like to establish a new PCP here with Dr. Sarah Otto.  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  _____________________________________________________________________  Past Medical History:   Diagnosis Date    Asthma     exercise induced- not taking inhalers currently    Hypertension     Sleep apnea     diagnosed by dentist does not use a machine    Wellness examination     Dr. Nahum Beth                                                                   Past Surgical History:   Procedure Laterality Date    APPENDECTOMY      [address] AND CURETTAGE OF UTERUS  2019    [address] [place] AND CURETTAGE OF UTERUS N/A 2019    NOVASURE- DILATATION AND CURETTAGE HYSTEROSCOPY performed by Jonah Smith MD at [address] [place] [address]  2019     Family History   Problem Relation Age of Onset    Cancer Paternal Grandmother     Hypertension Maternal Grandmother     Heart Disease Maternal Grandmother     Hypertension Mother     Heart Disease Mother     Other Father         suicide    High Blood Pressure Sister     Arthritis Sister     High Blood Pressure Maternal Aunt     High Blood Pressure Maternal Uncle     Stroke Paternal Uncle      Social History     Tobacco Use   Smoking Status Every Day    Packs/day: 0.50    Years: 17.00    Pack years: 8.50    Types: Cigarettes   Smokeless Tobacco Never     Social History     Substance and Sexual Activity   Alcohol Use No    Comment: social     Current Outpatient Medications   Medication Sig Dispense Refill    metFORMIN (GLUCOPHAGE-XR) 500 MG extended release tablet       losartan-hydrochlorothiazide (HYZAAR) 100-12.5 MG per tablet Take 1 tablet by mouth daily      bisoprolol-hydrochlorothiazide (ZIAC) 10-6.25 MG per tablet Take 1 tablet by mouth daily      ibuprofen (ADVIL;MOTRIN) 800 MG tablet Take 1 tablet by mouth every 8 hours as needed for Pain or Fever (Patient not taking: Reported on 2021) 60 tablet 0     No current facility-administered medications for this visit. Allergies: Allergies   Allergen Reactions    Other Rash     There is a medication/antibiotic that the patient took \"a long time ago\", but she cannot remember what it was or what it was prescribed for. Gave her a rash on her face       Gynecologic History:  No LMP recorded. Patient has had an ablation.   Sexually Active: Yes  STD History: Yes CT/TV  Abnormal Pap History yes  Birth Control: No    OB History    Para Term  AB Living   6 3 0 0 1 2   SAB IAB Ectopic Molar Multiple Live Births   0 0 0 0 0 2   Obstetric Comments   Still born birth     ______________________________________________________________________  REVIEW OF SYSTEMS:        Constitutional:  Unexpected weight change no  Neurological:  Frequent headaches  no  Ophthalmic:  Recent visual changes no  ENT:   Difficulty swallowing  no  Breast:              Masses   no     Respiratory:  Shortness of breath  no    Cardiovascular: Chest pain   no     Gastrointestinal: Chronic diarrhea/constipation no   Urogenital:  Urinary incontinence  no Heavy/irregular periods           no                                      Vaginal discharge                   no  Hematological: Bruises easy   no     Endocrine:  Nipple Discharge  no     Hot/Cold Intolerance  no   Psychological:  Mood and affect were wnl yes                                                                                                                                           Physical Exam:    Vitals:    09/26/22 0953   BP: 130/82   Site: Left Upper Arm   Position: Sitting   Cuff Size: Large Adult   Weight: 221 lb (100.2 kg)   Height: 5' 2\" (1.575 m)       General Appearance:  She does not appear to be in any distress. This  is a well developed, well nourished, well groomed female. Neurological:  The patient is alert and oriented to time, place, person, and situation without any noted sensory motor deficits. Skin:  A brief inspection of the skin revealed no rashes or lesions. Neck:  The neck was supple. There is no tracheal deviation, thyromegaly or supraclavicular adenopathy appreciated. Breast:   The patients breasts were symmetrical.  Breasts are nontender and there  were no masses, discharge or pathologic skin changes. There is no supraclavicular or axillary adenopathy bilaterally. Respiratory: There was unlabored respiratory effort. Lungs clear to ascultation without wheezes, rales or rhonchi in all fields bilaterally. Cardiovascular:  Normal sinus rhythm with a regular rate and without murmur, rubs or gallops. Abdomen: The abdomen was soft and non-tender with no guarding, rebound, CVAT or rigidity. No hernias were appreciated. Bowel sounds were normally active. Pelvic exam:  No vulvar, vaginal or cervical lesions are noted. Normal vaginal discharge present, no significant cystocele, rectocele or enterocele noted. Uterus nongravid and without CMT and adnexa nontender and without abnormal masses bilaterally.     Extremities:  FROM and nontender without

## 2022-09-26 NOTE — PATIENT INSTRUCTIONS
You can establish a new primary care visit with Dr. Evaristo Turk. Please get your mammogram done as scheduled in January. We will contact you with that result as well as today's Pap smear. Return to the office in 1 year or as needed. Be safe and have a healthy year!

## 2022-10-06 LAB — CYTOLOGY REPORT: NORMAL

## 2023-02-02 ENCOUNTER — OFFICE VISIT (OUTPATIENT)
Dept: FAMILY MEDICINE CLINIC | Age: 48
End: 2023-02-02
Payer: COMMERCIAL

## 2023-02-02 VITALS
HEART RATE: 86 BPM | TEMPERATURE: 97.6 F | HEIGHT: 62 IN | DIASTOLIC BLOOD PRESSURE: 88 MMHG | SYSTOLIC BLOOD PRESSURE: 138 MMHG | BODY MASS INDEX: 41.11 KG/M2 | WEIGHT: 223.4 LBS | OXYGEN SATURATION: 99 %

## 2023-02-02 DIAGNOSIS — Z11.4 ENCOUNTER FOR SCREENING FOR HIV: ICD-10-CM

## 2023-02-02 DIAGNOSIS — Z76.89 ENCOUNTER TO ESTABLISH CARE WITH NEW DOCTOR: ICD-10-CM

## 2023-02-02 DIAGNOSIS — Z13.1 DIABETES MELLITUS SCREENING: ICD-10-CM

## 2023-02-02 DIAGNOSIS — E66.01 MORBID OBESITY WITH BMI OF 40.0-44.9, ADULT (HCC): ICD-10-CM

## 2023-02-02 DIAGNOSIS — Z11.59 NEED FOR HEPATITIS C SCREENING TEST: ICD-10-CM

## 2023-02-02 DIAGNOSIS — I10 PRIMARY HYPERTENSION: ICD-10-CM

## 2023-02-02 DIAGNOSIS — Z12.11 COLON CANCER SCREENING: ICD-10-CM

## 2023-02-02 LAB — HBA1C MFR BLD: 6.7 %

## 2023-02-02 PROCEDURE — G8427 DOCREV CUR MEDS BY ELIG CLIN: HCPCS | Performed by: FAMILY MEDICINE

## 2023-02-02 PROCEDURE — 83036 HEMOGLOBIN GLYCOSYLATED A1C: CPT | Performed by: FAMILY MEDICINE

## 2023-02-02 PROCEDURE — 99203 OFFICE O/P NEW LOW 30 MIN: CPT | Performed by: FAMILY MEDICINE

## 2023-02-02 PROCEDURE — G8484 FLU IMMUNIZE NO ADMIN: HCPCS | Performed by: FAMILY MEDICINE

## 2023-02-02 PROCEDURE — 3079F DIAST BP 80-89 MM HG: CPT | Performed by: FAMILY MEDICINE

## 2023-02-02 PROCEDURE — G8417 CALC BMI ABV UP PARAM F/U: HCPCS | Performed by: FAMILY MEDICINE

## 2023-02-02 PROCEDURE — 1036F TOBACCO NON-USER: CPT | Performed by: FAMILY MEDICINE

## 2023-02-02 PROCEDURE — 3075F SYST BP GE 130 - 139MM HG: CPT | Performed by: FAMILY MEDICINE

## 2023-02-02 RX ORDER — BISOPROLOL FUMARATE AND HYDROCHLOROTHIAZIDE 10; 6.25 MG/1; MG/1
1 TABLET ORAL DAILY
Qty: 30 TABLET | Refills: 3 | Status: SHIPPED | OUTPATIENT
Start: 2023-02-02

## 2023-02-02 RX ORDER — LOSARTAN POTASSIUM AND HYDROCHLOROTHIAZIDE 12.5; 1 MG/1; MG/1
1 TABLET ORAL DAILY
Qty: 30 TABLET | Refills: 3 | Status: SHIPPED | OUTPATIENT
Start: 2023-02-02

## 2023-02-02 RX ORDER — CHLORHEXIDINE GLUCONATE 4 G/100ML
SOLUTION TOPICAL
Qty: 236 ML | Refills: 2 | Status: SHIPPED | OUTPATIENT
Start: 2023-02-02 | End: 2023-02-16

## 2023-02-02 SDOH — ECONOMIC STABILITY: FOOD INSECURITY: WITHIN THE PAST 12 MONTHS, YOU WORRIED THAT YOUR FOOD WOULD RUN OUT BEFORE YOU GOT MONEY TO BUY MORE.: NEVER TRUE

## 2023-02-02 SDOH — ECONOMIC STABILITY: INCOME INSECURITY: HOW HARD IS IT FOR YOU TO PAY FOR THE VERY BASICS LIKE FOOD, HOUSING, MEDICAL CARE, AND HEATING?: NOT HARD AT ALL

## 2023-02-02 SDOH — ECONOMIC STABILITY: HOUSING INSECURITY
IN THE LAST 12 MONTHS, WAS THERE A TIME WHEN YOU DID NOT HAVE A STEADY PLACE TO SLEEP OR SLEPT IN A SHELTER (INCLUDING NOW)?: NO

## 2023-02-02 SDOH — ECONOMIC STABILITY: FOOD INSECURITY: WITHIN THE PAST 12 MONTHS, THE FOOD YOU BOUGHT JUST DIDN'T LAST AND YOU DIDN'T HAVE MONEY TO GET MORE.: NEVER TRUE

## 2023-02-02 ASSESSMENT — PATIENT HEALTH QUESTIONNAIRE - PHQ9
SUM OF ALL RESPONSES TO PHQ9 QUESTIONS 1 & 2: 0
SUM OF ALL RESPONSES TO PHQ QUESTIONS 1-9: 0
SUM OF ALL RESPONSES TO PHQ QUESTIONS 1-9: 0
2. FEELING DOWN, DEPRESSED OR HOPELESS: 0
SUM OF ALL RESPONSES TO PHQ QUESTIONS 1-9: 0
1. LITTLE INTEREST OR PLEASURE IN DOING THINGS: 0
SUM OF ALL RESPONSES TO PHQ QUESTIONS 1-9: 0

## 2023-02-02 NOTE — PROGRESS NOTES
2023    Marysol Porter (:  1975) is a 56 y.o. female, coming today to establish care. Patient Active Problem List   Diagnosis    Hypertension affecting pregnancy    BMI 39.0-39.9,adult    Hx of gestational diabetes in prior pregnancy, currently pregnant    Hx of  section    History of stillbirth in currently pregnant patient     The patient states that she had 1 episode really dizzy and light headed like fainting - BP was normal.  She really does not know where she had it. A1C was 6.7. She was told she has diabetes but she has a lot of questions. . . 2 boys. No violence at the current living place. No concerns about sexual transmitted diseases. Tobacco use: quite in , 0.5 pp/20 year. Alcohol use: 2-3 drinks every other day. Other drug use: none  Depressed: not depressed    Mom 68 yo - CHF, HTN, CKD. Dad ?? yo - - committed suicide. Siblings: 1 sister. Vaccinations: up date  Mammogram: Gyn  Pap smear: Gyn  Colonoscopy: none    Review of Systems  Pertinent items are noted in HPI. Prior to Visit Medications    Medication Sig Taking? Authorizing Provider   bisoprolol-hydroCHLOROthiazide (ZIAC) 10-6.25 MG per tablet Take 1 tablet by mouth daily Yes John De Jesus MD   losartan-hydroCHLOROthiazide (HYZAAR) 100-12.5 MG per tablet Take 1 tablet by mouth daily Yes John De Jesus MD   metFORMIN (GLUCOPHAGE) 500 MG tablet Take 1 tablet by mouth 2 times daily (with meals) Yes John De Jesus MD   chlorhexidine (HIBICLENS) 4 % external liquid Apply topically daily as needed.  Yes John De Jesus MD   ibuprofen (ADVIL;MOTRIN) 800 MG tablet Take 1 tablet by mouth every 8 hours as needed for Pain or Fever  Patient not taking: No sig reported  Lyle Pedraza DO        Allergies   Allergen Reactions    Other Rash     There is a medication/antibiotic that the patient took \"a long time ago\", but she cannot remember what it was or what it was prescribed for.  Gave her a rash on her face       Past Medical History:   Diagnosis Date    Asthma     exercise induced- not taking inhalers currently    Hypertension     Sleep apnea     diagnosed by dentist does not use a machine    Wellness examination     Dr. Padilla-PCP       Past Surgical History:   Procedure Laterality Date    APPENDECTOMY       SECTION      DILATION AND CURETTAGE      DILATION AND CURETTAGE OF UTERUS  2019    NOVASURE    DILATION AND CURETTAGE OF UTERUS N/A 2019    NOVASURE- DILATATION AND CURETTAGE HYSTEROSCOPY performed by [facility]arden MD at [facility] OR    HYSTEROSCOPY  2019       Social History     Socioeconomic History    Marital status:      Spouse name: Not on file    Number of children: Not on file    Years of education: Not on file    Highest education level: Not on file   Occupational History    Not on file   Tobacco Use    Smoking status: Former     Packs/day: 0.50     Years: 17.00     Pack years: 8.50     Types: Cigarettes    Smokeless tobacco: Never   Vaping Use    Vaping Use: Never used   Substance and Sexual Activity    Alcohol use: No     Comment: social    Drug use: No     Types: Marijuana (Weed)     Comment: 2019    Sexual activity: Yes     Partners: Male   Other Topics Concern    Not on file   Social History Narrative    Not on file     Social Determinants of Health     Financial Resource Strain: Low Risk     Difficulty of Paying Living Expenses: Not hard at all   Food Insecurity: No Food Insecurity    Worried About Running Out of Food in the Last Year: Never true    Ran Out of Food in the Last Year: Never true   Transportation Needs: Unknown    Lack of Transportation (Medical): Not on file    Lack of Transportation (Non-Medical): No   Physical Activity: Not on file   Stress: Not on file   Social Connections: Not on file   Intimate Partner Violence: Not on file   Housing Stability: Unknown    Unable to Pay for Housing in the Last  Year: Not on file    Number of Places Lived in the Last Year: Not on file    Unstable Housing in the Last Year: No        Family History   Problem Relation Age of Onset    Cancer Paternal Grandmother     Hypertension Maternal Grandmother     Heart Disease Maternal Grandmother     Hypertension Mother     Heart Disease Mother     Other Father         suicide    High Blood Pressure Sister     Arthritis Sister     High Blood Pressure Maternal Aunt     High Blood Pressure Maternal Uncle     Stroke Paternal Uncle        ADVANCE DIRECTIVE: N, <no information>    Vitals:    02/02/23 1128   BP: 138/88   Pulse: 86   Temp: 97.6 °F (36.4 °C)   SpO2: 99%   Weight: 223 lb 6.4 oz (101.3 kg)   Height: 5' 2\" (1.575 m)     Estimated body mass index is 40.86 kg/m² as calculated from the following:    Height as of this encounter: 5' 2\" (1.575 m). Weight as of this encounter: 223 lb 6.4 oz (101.3 kg). Physical Exam  HENT:   /88   Pulse 86   Temp 97.6 °F (36.4 °C)   Ht 5' 2\" (1.575 m)   Wt 223 lb 6.4 oz (101.3 kg)   SpO2 99%   BMI 40.86 kg/m²   Constitutional: Alert and oriented. Well-nourished. Head: Normocephalic and atraumatic. Right Ear: External ear normal. TM: no bulging, erythema or fluid seen. Left Ear: External ear normal. TM: no bulging, erythema or fluid seen. Nose: Nose normal.   Mouth/Throat: Oropharynx is clear and moist.  Teeth in good repair. Eyes: Pupils are equal, round, and reactive to light. Right eye exhibits no discharge. Left eye exhibits no discharge. No scleral icterus. Neck: Normal range of motion. Neck supple. No JVD present. No tracheal deviation present. No thyromegaly present. Cardiovascular: Normal rate, regular rhythm, normal heart sounds. Pulmonary/Chest: Effort normal and breath sounds normal. No respiratory distress. She has no wheezes. She has no rales. Abdominal: Soft. Bowel sounds are normal.  She exhibits no distension and no mass. There is no tenderness.  There is no rebound and no guarding. Musculoskeletal: Normal range of motion. She exhibits no edema or tenderness. Lymphadenopathy:    She has no cervical adenopathy. Neurological:  She is alert and oriented to person, place, and time. Cranial nerves grossly intact. No sensation problem noted. Muscle strength 5/5 throughout. Skin: Skin is warm and dry. No rash noted. No erythema. Psychiatric:  She has a normal mood and affect. Behavior is normal.    A1C: 6.7. No flowsheet data found.     Lab Results   Component Value Date/Time    CHOL 183 09/10/2020 10:53 AM    CHOLFAST 187 04/13/2021 08:45 AM    TRIG 250 09/10/2020 10:53 AM    TRIGLYCFAST 202 04/13/2021 08:45 AM    HDL 49 04/13/2021 08:45 AM    HDL 41 09/10/2020 10:53 AM    LDLCHOLESTEROL 98 04/13/2021 08:45 AM    LDLCHOLESTEROL 92 09/10/2020 10:53 AM    GLUCOSE 125 12/27/2019 09:24 AM    LABA1C 6.7 02/02/2023 11:49 AM       The 10-year ASCVD risk score (Patricia VERGARA, et al., 2019) is: 4.1%    Values used to calculate the score:      Age: 56 years      Sex: Female      Is Non- : Yes      Diabetic: No      Tobacco smoker: No      Systolic Blood Pressure: 639 mmHg      Is BP treated: Yes      HDL Cholesterol: 49 mg/dL      Total Cholesterol: 187 mg/dL    Immunization History   Administered Date(s) Administered    COVID-19, PFIZER PURPLE top, DILUTE for use, (age 15 y+), 30mcg/0.3mL 05/10/2021, 06/01/2021       Health Maintenance   Topic Date Due    Pneumococcal 0-64 years Vaccine (1 - PCV) Never done    Depression Screen  Never done    HIV screen  Never done    Hepatitis C screen  Never done    DTaP/Tdap/Td vaccine (1 - Tdap) Never done    Colorectal Cancer Screen  Never done    COVID-19 Vaccine (3 - Booster for Herrera Peter series) 07/27/2021    Flu vaccine (1) Never done    Cervical cancer screen  09/26/2025    Diabetes screen  02/02/2026    Lipids  04/13/2026    Hepatitis A vaccine  Aged Out    Hib vaccine  Aged Out    Meningococcal (ACWY) vaccine  Aged Out       Assessment & Plan   Encounter to establish care with new doctor  -     CBC with Auto Differential; Future  -     TSH with Reflex; Future  -     Comprehensive Metabolic Panel; Future  -     Lipid Panel; Future  -     Hepatitis C Antibody; Future  -     HIV Screen; Future  -     Fecal DNA Colorectal cancer screening (Cologuard)  Primary hypertension  -     CBC with Auto Differential; Future  -     TSH with Reflex; Future  -     Comprehensive Metabolic Panel; Future  -     Lipid Panel; Future  -     Hepatitis C Antibody; Future  -     HIV Screen; Future  Morbid obesity with BMI of 40.0-44.9, adult (HCC)  -     CBC with Auto Differential; Future  -     TSH with Reflex; Future  -     Comprehensive Metabolic Panel; Future  -     Lipid Panel; Future  -     Hepatitis C Antibody; Future  -     HIV Screen; Future  Colon cancer screening  -     Fecal DNA Colorectal cancer screening (Cologuard)  Need for hepatitis C screening test  -     Hepatitis C Antibody; Future  Encounter for screening for HIV  -     HIV Screen; Future  Diabetes mellitus screening  -     POCT glycosylated hemoglobin (Hb A1C)  Patient is doing well overall. Blood pressure well controlled  Get a Cologuard done. A1c 6.7 today but I feel the patient has been doing well even without metformin. Metformin started again. See her back in 3 months. Discussed exercise and diet and the diagnosis of diabetes in detail. Call or return to clinic prn if these symptoms worsen or fail to improve as anticipated. I have reviewed the instructions with the patient, answering all questions to her satisfaction.         Return in 3 months (on 5/2/2023), or if symptoms worsen or fail to improve, for DM II.         --Bonnetta Najjar, MD    (Please note that portions of this note were completed with a voice recognition program. Efforts were made to edit the dictations but occasionally words are mis-transcribed.)

## 2023-02-07 ENCOUNTER — HOSPITAL ENCOUNTER (OUTPATIENT)
Age: 48
Setting detail: SPECIMEN
Discharge: HOME | End: 2023-02-07

## 2023-02-07 DIAGNOSIS — I10 PRIMARY HYPERTENSION: ICD-10-CM

## 2023-02-07 DIAGNOSIS — Z76.89 ENCOUNTER TO ESTABLISH CARE WITH NEW DOCTOR: ICD-10-CM

## 2023-02-07 DIAGNOSIS — Z11.4 ENCOUNTER FOR SCREENING FOR HIV: ICD-10-CM

## 2023-02-07 DIAGNOSIS — Z11.59 NEED FOR HEPATITIS C SCREENING TEST: ICD-10-CM

## 2023-02-07 DIAGNOSIS — E66.01 MORBID OBESITY WITH BMI OF 40.0-44.9, ADULT (HCC): ICD-10-CM

## 2023-02-07 LAB
ABSOLUTE EOS #: 0.07 K/UL (ref 0–0.44)
ABSOLUTE IMMATURE GRANULOCYTE: 0.05 K/UL (ref 0–0.3)
ABSOLUTE LYMPH #: 2.68 K/UL (ref 1.1–3.7)
ABSOLUTE MONO #: 0.69 K/UL (ref 0.1–1.2)
ALBUMIN SERPL-MCNC: 3.9 G/DL (ref 3.5–5.2)
ALBUMIN/GLOBULIN RATIO: 1 (ref 1–2.5)
ALP SERPL-CCNC: 103 U/L (ref 35–104)
ALT SERPL-CCNC: 13 U/L (ref 5–33)
ANION GAP SERPL CALCULATED.3IONS-SCNC: 15 MMOL/L (ref 9–17)
AST SERPL-CCNC: 17 U/L
BASOPHILS # BLD: 0 % (ref 0–2)
BASOPHILS ABSOLUTE: 0.05 K/UL (ref 0–0.2)
BILIRUB SERPL-MCNC: 0.4 MG/DL (ref 0.3–1.2)
BUN SERPL-MCNC: 11 MG/DL (ref 6–20)
CALCIUM SERPL-MCNC: 9.5 MG/DL (ref 8.6–10.4)
CHLORIDE SERPL-SCNC: 97 MMOL/L (ref 98–107)
CHOLEST SERPL-MCNC: 214 MG/DL
CHOLESTEROL/HDL RATIO: 3.7
CO2 SERPL-SCNC: 26 MMOL/L (ref 20–31)
CREAT SERPL-MCNC: 0.83 MG/DL (ref 0.5–0.9)
EOSINOPHILS RELATIVE PERCENT: 1 % (ref 1–4)
GFR SERPL CREATININE-BSD FRML MDRD: >60 ML/MIN/1.73M2
GLUCOSE SERPL-MCNC: 126 MG/DL (ref 70–99)
HCT VFR BLD AUTO: 43.1 % (ref 36.3–47.1)
HCV AB SER QL: NONREACTIVE
HDLC SERPL-MCNC: 58 MG/DL
HGB BLD-MCNC: 14.5 G/DL (ref 11.9–15.1)
IMMATURE GRANULOCYTES: 0 %
LDLC SERPL CALC-MCNC: 120 MG/DL (ref 0–130)
LYMPHOCYTES # BLD: 24 % (ref 24–43)
MCH RBC QN AUTO: 33.4 PG (ref 25.2–33.5)
MCHC RBC AUTO-ENTMCNC: 33.6 G/DL (ref 28.4–34.8)
MCV RBC AUTO: 99.3 FL (ref 82.6–102.9)
MONOCYTES # BLD: 6 % (ref 3–12)
NRBC AUTOMATED: 0 PER 100 WBC
PDW BLD-RTO: 12.7 % (ref 11.8–14.4)
PLATELET # BLD AUTO: 409 K/UL (ref 138–453)
PMV BLD AUTO: 10.2 FL (ref 8.1–13.5)
POTASSIUM SERPL-SCNC: 3.9 MMOL/L (ref 3.7–5.3)
PROT SERPL-MCNC: 7.8 G/DL (ref 6.4–8.3)
RBC # BLD: 4.34 M/UL (ref 3.95–5.11)
SEG NEUTROPHILS: 69 % (ref 36–65)
SEGMENTED NEUTROPHILS ABSOLUTE COUNT: 7.8 K/UL (ref 1.5–8.1)
SODIUM SERPL-SCNC: 138 MMOL/L (ref 135–144)
TRIGL SERPL-MCNC: 178 MG/DL
TSH SERPL-ACNC: 2.09 UIU/ML (ref 0.3–5)
WBC # BLD AUTO: 11.3 K/UL (ref 3.5–11.3)

## 2023-02-08 LAB — HIV 1+2 AB+HIV1 P24 AG SERPL QL IA: NONREACTIVE

## 2023-03-30 LAB — NONINV COLON CA DNA+OCC BLD SCRN STL QL: NEGATIVE

## 2023-05-09 ENCOUNTER — HOSPITAL ENCOUNTER (OUTPATIENT)
Age: 48
Setting detail: SPECIMEN
Discharge: HOME | End: 2023-05-09

## 2023-05-09 ENCOUNTER — OFFICE VISIT (OUTPATIENT)
Dept: FAMILY MEDICINE CLINIC | Age: 48
End: 2023-05-09
Payer: COMMERCIAL

## 2023-05-09 VITALS
WEIGHT: 220.6 LBS | SYSTOLIC BLOOD PRESSURE: 115 MMHG | TEMPERATURE: 97 F | DIASTOLIC BLOOD PRESSURE: 79 MMHG | OXYGEN SATURATION: 99 % | BODY MASS INDEX: 40.35 KG/M2 | HEART RATE: 79 BPM

## 2023-05-09 DIAGNOSIS — E11.9 TYPE 2 DIABETES MELLITUS WITHOUT COMPLICATION, WITHOUT LONG-TERM CURRENT USE OF INSULIN (HCC): Primary | ICD-10-CM

## 2023-05-09 DIAGNOSIS — E11.9 TYPE 2 DIABETES MELLITUS WITHOUT COMPLICATION, WITHOUT LONG-TERM CURRENT USE OF INSULIN (HCC): ICD-10-CM

## 2023-05-09 LAB
EST. AVERAGE GLUCOSE BLD GHB EST-MCNC: 148 MG/DL
HBA1C MFR BLD: 6.8 % (ref 4–6)

## 2023-05-09 PROCEDURE — G8417 CALC BMI ABV UP PARAM F/U: HCPCS | Performed by: FAMILY MEDICINE

## 2023-05-09 PROCEDURE — 1036F TOBACCO NON-USER: CPT | Performed by: FAMILY MEDICINE

## 2023-05-09 PROCEDURE — G8427 DOCREV CUR MEDS BY ELIG CLIN: HCPCS | Performed by: FAMILY MEDICINE

## 2023-05-09 PROCEDURE — 2022F DILAT RTA XM EVC RTNOPTHY: CPT | Performed by: FAMILY MEDICINE

## 2023-05-09 PROCEDURE — 99214 OFFICE O/P EST MOD 30 MIN: CPT | Performed by: FAMILY MEDICINE

## 2023-05-09 PROCEDURE — 3044F HG A1C LEVEL LT 7.0%: CPT | Performed by: FAMILY MEDICINE

## 2023-05-09 ASSESSMENT — PATIENT HEALTH QUESTIONNAIRE - PHQ9
SUM OF ALL RESPONSES TO PHQ QUESTIONS 1-9: 0
2. FEELING DOWN, DEPRESSED OR HOPELESS: 0
1. LITTLE INTEREST OR PLEASURE IN DOING THINGS: 0
SUM OF ALL RESPONSES TO PHQ QUESTIONS 1-9: 0
SUM OF ALL RESPONSES TO PHQ QUESTIONS 1-9: 0
SUM OF ALL RESPONSES TO PHQ9 QUESTIONS 1 & 2: 0
SUM OF ALL RESPONSES TO PHQ QUESTIONS 1-9: 0

## 2023-05-09 NOTE — PROGRESS NOTES
Virgen Shah returns today for postop checkup after having an unremarkable on Hysteroscopy, dilation and curettage, Novasure endometrial ablation for Abnormal uterine bleeding. She denies any fever, chills, nausea/vomiting, significant abdominal/pelvic discomfort, vaginal bleeding or UTI symptoms although she is having discharge requiring wearing a pad all day. She's having normal bowel and urinary function and ambulating without difficulty. Operative findings revealed: The uterine sound was used to measure the cervix and uterine cavity, measuring 4cm and 5cm respectively. Hysteroscopy was carried out revealing a normal shaggy proliferative endocervical and endometrial cavity. Pathology report shows :  Patient Name: Shreya Bangura [facility] Rec: [id number]   Path Number: [id number]   Collected: 12/27/2019   Received: 12/27/2019   Reported: 12/30/2019 08:18     -- Diagnosis --   1.  ENDOMETRIAL CURETTINGS:  SLOUGHING ENDOMETRIUM.  NEGATIVE FOR   ATYPIA AND MALIGNANCY. 2.  ENDOCERVICAL CURETTINGS:  ENDOCERVICAL AND SQUAMOUS MUCOSA WITH   MILD CHRONIC INFLAMMATION.  NEGATIVE FOR DYSPLASIA AND MALIGNANCY. Physical exam      Vitals:    01/06/20 0850   BP: 118/72   Site: Right Upper Arm   Position: Sitting   Cuff Size: Large Adult   Weight: 232 lb (105.2 kg)   Height: 5' 2\" (1.575 m)       General appearance: Patient appears to be in no significant distress. Lungs are clear to auscultation in all fields bilaterally without wheezes, rales or rhonchi. Cardiac exam with normal sinus rhythm and rate without murmurs. The abdomen is non-tender without signs of infection. There is no organomegaly or CVAT. Bowel sounds are normally active. No vulvar, vaginal or cervical lesions. Small amount of slightly malodorous tinged discharge present. Uterus nongravid and without CMT. Adnexa nontender without abnormal masses bilaterally. Extremities nontender without edema. Assessment/Plan:     ICD-10-CM    1.
111

## 2023-05-09 NOTE — PROGRESS NOTES
could end up with comorbidities including kidney failure nerve damage infections. Await results of the A1c. Discussed with her again exercise and diet. Return in about 6 months (around 11/9/2023), or if symptoms worsen or fail to improve, for DM II. Orders Placed This Encounter   Procedures    Hemoglobin A1C     Standing Status:   Future     Number of Occurrences:   1     Standing Expiration Date:   5/8/2024     No orders of the defined types were placed in this encounter. Call or return to clinic prn if these symptoms worsen or fail to improve as anticipated. I have reviewed the instructions with the patient, answering all questions to patient's satisfaction. Pavan received counseling on the following healthy behaviors: nutrition, exercise, and medication adherence  Reviewed prior labs and health maintenance. Continue current medications, diet and exercise. Discussed use, benefit, and side effects of prescribed medications. Barriers to medication compliance addressed. Patient given educational materials - see patient instructions. All patient questions answered. Patient voiced understanding.       Electronically signed by Casandra Ortiz MD on 5/9/2023 at 9:04 AM       (Please note that portions of this note were completed with a voice recognition program. Efforts were made to edit the dictations but occasionally words are mis-transcribed.)

## 2023-06-27 RX ORDER — BISOPROLOL FUMARATE AND HYDROCHLOROTHIAZIDE 10; 6.25 MG/1; MG/1
TABLET ORAL
Qty: 30 TABLET | Refills: 3 | Status: SHIPPED | OUTPATIENT
Start: 2023-06-27

## 2023-06-27 RX ORDER — LOSARTAN POTASSIUM AND HYDROCHLOROTHIAZIDE 12.5; 1 MG/1; MG/1
TABLET ORAL
Qty: 30 TABLET | Refills: 3 | Status: SHIPPED | OUTPATIENT
Start: 2023-06-27

## 2023-09-28 ENCOUNTER — OFFICE VISIT (OUTPATIENT)
Dept: OBGYN CLINIC | Age: 48
End: 2023-09-28
Payer: COMMERCIAL

## 2023-09-28 ENCOUNTER — HOSPITAL ENCOUNTER (OUTPATIENT)
Age: 48
Setting detail: SPECIMEN
Discharge: HOME | End: 2023-09-28

## 2023-09-28 VITALS — BODY MASS INDEX: 36.58 KG/M2 | SYSTOLIC BLOOD PRESSURE: 122 MMHG | WEIGHT: 200 LBS | DIASTOLIC BLOOD PRESSURE: 82 MMHG

## 2023-09-28 DIAGNOSIS — Z01.419 ENCOUNTER FOR GYNECOLOGICAL EXAMINATION WITHOUT ABNORMAL FINDING: Primary | ICD-10-CM

## 2023-09-28 DIAGNOSIS — Z12.31 VISIT FOR SCREENING MAMMOGRAM: ICD-10-CM

## 2023-09-28 PROCEDURE — 99396 PREV VISIT EST AGE 40-64: CPT | Performed by: OBSTETRICS & GYNECOLOGY

## 2023-09-28 ASSESSMENT — PATIENT HEALTH QUESTIONNAIRE - PHQ9
1. LITTLE INTEREST OR PLEASURE IN DOING THINGS: 0
SUM OF ALL RESPONSES TO PHQ QUESTIONS 1-9: 0
SUM OF ALL RESPONSES TO PHQ QUESTIONS 1-9: 0
SUM OF ALL RESPONSES TO PHQ9 QUESTIONS 1 & 2: 0
SUM OF ALL RESPONSES TO PHQ QUESTIONS 1-9: 0
SUM OF ALL RESPONSES TO PHQ QUESTIONS 1-9: 0
2. FEELING DOWN, DEPRESSED OR HOPELESS: 0

## 2023-09-28 NOTE — PATIENT INSTRUCTIONS
Please get your mammogram done as scheduled. We will let you know that result as well as today's Pap. Plan on returning to the office in 1 year or as needed. Have a wonderful year!

## 2023-09-28 NOTE — PROGRESS NOTES
[facility] [place] [place]  [address] [address]       DATE OF VISIT:  23        History and Physical    Mirian Verduzco    :  1975  CHIEF COMPLAINT:    Chief Complaint   Patient presents with    Annual Exam     Here for annual last pap 22neg Last mammogram 22neg                     HPI :   Mirian Verduzco is a 53 y.o. femaleGRAVIDA 6 PARA    PCP: Prosper Dorantes MD    Mirian Verduzco returns today for her annual exam.  She denies any vaginal bleeding since her ablation. She is due for her mammogram.  She had a recent visit with Dr. Monik Roman and her total cholesterol and hemoglobin A1c was slightly elevated. She has a follow-up 10/12/2023. She is having regular bowel movements without constipation or diarrhea. She denies any involuntary loss of urine. She is otherwise without any significant complaints today.   Yves Laws is still doing private review of the state and her children are now ages 25 and 15.  _____________________________________________________________________  Past Medical History:   Diagnosis Date    Asthma     exercise induced- not taking inhalers currently    Hypertension     Sleep apnea     diagnosed by dentist does not use a machine    Wellness examination     Dr. Missy Dooley                                                                   Past Surgical History:   Procedure Laterality Date    APPENDECTOMY      [address] OF UTERUS  2019    [address] OF UTERUS N/A 2019    NOVASURE- DILATATION AND CURETTAGE HYSTEROSCOPY performed by Fernie Santoyo MD at [address]  2019     Family History   Problem Relation Age of Onset    Cancer Paternal Grandmother     Hypertension Maternal Grandmother     Heart Disease Maternal Grandmother     Hypertension Mother     Heart

## 2023-10-06 LAB — CYTOLOGY REPORT: NORMAL

## 2023-10-12 ENCOUNTER — HOSPITAL ENCOUNTER (OUTPATIENT)
Age: 48
Setting detail: SPECIMEN
Discharge: HOME | End: 2023-10-12

## 2023-10-12 ENCOUNTER — OFFICE VISIT (OUTPATIENT)
Dept: FAMILY MEDICINE CLINIC | Age: 48
End: 2023-10-12
Payer: COMMERCIAL

## 2023-10-12 VITALS
OXYGEN SATURATION: 99 % | WEIGHT: 223.2 LBS | HEART RATE: 77 BPM | SYSTOLIC BLOOD PRESSURE: 131 MMHG | TEMPERATURE: 98 F | DIASTOLIC BLOOD PRESSURE: 89 MMHG | BODY MASS INDEX: 40.82 KG/M2

## 2023-10-12 DIAGNOSIS — E11.9 TYPE 2 DIABETES MELLITUS WITHOUT COMPLICATION, WITHOUT LONG-TERM CURRENT USE OF INSULIN (HCC): ICD-10-CM

## 2023-10-12 DIAGNOSIS — Z23 NEED FOR PROPHYLACTIC VACCINATION WITH TETANUS-DIPHTHERIA (TD): ICD-10-CM

## 2023-10-12 DIAGNOSIS — Z00.01 ENCOUNTER FOR WELL ADULT EXAM WITH ABNORMAL FINDINGS: Primary | ICD-10-CM

## 2023-10-12 LAB — HBA1C MFR BLD: 6.9 %

## 2023-10-12 PROCEDURE — 1036F TOBACCO NON-USER: CPT | Performed by: FAMILY MEDICINE

## 2023-10-12 PROCEDURE — G8417 CALC BMI ABV UP PARAM F/U: HCPCS | Performed by: FAMILY MEDICINE

## 2023-10-12 PROCEDURE — 99213 OFFICE O/P EST LOW 20 MIN: CPT | Performed by: FAMILY MEDICINE

## 2023-10-12 PROCEDURE — G8484 FLU IMMUNIZE NO ADMIN: HCPCS | Performed by: FAMILY MEDICINE

## 2023-10-12 PROCEDURE — G8427 DOCREV CUR MEDS BY ELIG CLIN: HCPCS | Performed by: FAMILY MEDICINE

## 2023-10-12 PROCEDURE — 3044F HG A1C LEVEL LT 7.0%: CPT | Performed by: FAMILY MEDICINE

## 2023-10-12 PROCEDURE — 99396 PREV VISIT EST AGE 40-64: CPT | Performed by: FAMILY MEDICINE

## 2023-10-12 PROCEDURE — 83036 HEMOGLOBIN GLYCOSYLATED A1C: CPT | Performed by: FAMILY MEDICINE

## 2023-10-12 PROCEDURE — 2022F DILAT RTA XM EVC RTNOPTHY: CPT | Performed by: FAMILY MEDICINE

## 2023-10-12 RX ORDER — GLIMEPIRIDE 1 MG/1
1 TABLET ORAL
Qty: 30 TABLET | Refills: 5 | Status: SHIPPED | OUTPATIENT
Start: 2023-10-12

## 2023-10-12 ASSESSMENT — PATIENT HEALTH QUESTIONNAIRE - PHQ9
SUM OF ALL RESPONSES TO PHQ9 QUESTIONS 1 & 2: 0
SUM OF ALL RESPONSES TO PHQ QUESTIONS 1-9: 0
2. FEELING DOWN, DEPRESSED OR HOPELESS: 0
SUM OF ALL RESPONSES TO PHQ QUESTIONS 1-9: 0
1. LITTLE INTEREST OR PLEASURE IN DOING THINGS: 0

## 2023-10-12 NOTE — PROGRESS NOTES
Well Adult Note  Name: Hanna Laughlin Date: 10/12/2023   MRN: [id number] Sex: Female   Age: 53 y.o. Ethnicity: Non- / Non    : 1975 Race: Noni Alvarado / Yuliana Guillen is here for well adult exam.    History:  No concerns. Patient is doing well overall. Her last A1c was 6.8. Weight is stable. Review of Systems  Pertinent items are noted in HPI. Allergies   Allergen Reactions    Other Rash     There is a medication/antibiotic that the patient took \"a long time ago\", but she cannot remember what it was or what it was prescribed for. Gave her a rash on her face         Prior to Visit Medications    Medication Sig Taking?  Authorizing Provider   glimepiride (AMARYL) 1 MG tablet Take 1 tablet by mouth every morning (before breakfast) Yes Thu Disla MD   Tdap (ADACEL) 5-2-15.5 LF-MCG/0.5 injection Inject 0.5 mLs into the muscle once for 1 dose Yes Thu Disla MD   losartan-hydroCHLOROthiazide (HYZAAR) 100-12.5 MG per tablet take 1 tablet by mouth once daily Yes Thu Disla MD   bisoprolol-hydroCHLOROthiazide (ZIAC) 10-6.25 MG per tablet take 1 tablet by mouth once daily Yes Thu Disla MD   metFORMIN (GLUCOPHAGE) 500 MG tablet Take 1 tablet by mouth 2 times daily (with meals) Yes Thu Disla MD   ibuprofen (ADVIL;MOTRIN) 800 MG tablet Take 1 tablet by mouth every 8 hours as needed for Pain or Fever  Patient not taking: Reported on 2021  Marshia Schaumann, DO         Past Medical History:   Diagnosis Date    Asthma     exercise induced- not taking inhalers currently    Hypertension     Sleep apnea     diagnosed by dentist does not use a machine    Wellness examination     Dr. Jr Quach       Past Surgical History:   Procedure Laterality Date    APPENDECTOMY       SECTION      DILATION AND CURETTAGE      DILATION AND CURETTAGE OF UTERUS  2019    [address] OF UTERUS N/A 2019    Kris Middleton-

## 2023-10-13 LAB
CREAT UR-MCNC: 170 MG/DL (ref 28–217)
MICROALBUMIN UR-MCNC: 15 MG/L (ref 0–20)
MICROALBUMIN/CREAT UR-RTO: 9 MCG/MG CREAT (ref 0–25)

## 2023-11-10 RX ORDER — BISOPROLOL FUMARATE AND HYDROCHLOROTHIAZIDE 10; 6.25 MG/1; MG/1
TABLET ORAL
Qty: 30 TABLET | Refills: 3 | Status: SHIPPED | OUTPATIENT
Start: 2023-11-10

## 2023-11-10 RX ORDER — LOSARTAN POTASSIUM AND HYDROCHLOROTHIAZIDE 12.5; 1 MG/1; MG/1
TABLET ORAL
Qty: 30 TABLET | Refills: 3 | Status: SHIPPED | OUTPATIENT
Start: 2023-11-10

## 2024-01-11 ENCOUNTER — OFFICE VISIT (OUTPATIENT)
Dept: FAMILY MEDICINE CLINIC | Age: 49
End: 2024-01-11
Payer: COMMERCIAL

## 2024-01-11 VITALS
OXYGEN SATURATION: 98 % | BODY MASS INDEX: 40.97 KG/M2 | TEMPERATURE: 97.3 F | SYSTOLIC BLOOD PRESSURE: 111 MMHG | WEIGHT: 224 LBS | DIASTOLIC BLOOD PRESSURE: 77 MMHG | HEART RATE: 76 BPM

## 2024-01-11 DIAGNOSIS — E11.9 TYPE 2 DIABETES MELLITUS WITHOUT COMPLICATION, WITHOUT LONG-TERM CURRENT USE OF INSULIN (HCC): Primary | ICD-10-CM

## 2024-01-11 LAB — HBA1C MFR BLD: 6.1 %

## 2024-01-11 PROCEDURE — G8427 DOCREV CUR MEDS BY ELIG CLIN: HCPCS | Performed by: FAMILY MEDICINE

## 2024-01-11 PROCEDURE — 83036 HEMOGLOBIN GLYCOSYLATED A1C: CPT | Performed by: FAMILY MEDICINE

## 2024-01-11 PROCEDURE — 2022F DILAT RTA XM EVC RTNOPTHY: CPT | Performed by: FAMILY MEDICINE

## 2024-01-11 PROCEDURE — 99213 OFFICE O/P EST LOW 20 MIN: CPT | Performed by: FAMILY MEDICINE

## 2024-01-11 PROCEDURE — 1036F TOBACCO NON-USER: CPT | Performed by: FAMILY MEDICINE

## 2024-01-11 PROCEDURE — 3044F HG A1C LEVEL LT 7.0%: CPT | Performed by: FAMILY MEDICINE

## 2024-01-11 PROCEDURE — G8417 CALC BMI ABV UP PARAM F/U: HCPCS | Performed by: FAMILY MEDICINE

## 2024-01-11 PROCEDURE — G8484 FLU IMMUNIZE NO ADMIN: HCPCS | Performed by: FAMILY MEDICINE

## 2024-01-11 RX ORDER — GLIMEPIRIDE 1 MG/1
1 TABLET ORAL
Qty: 30 TABLET | Refills: 5 | Status: SHIPPED | OUTPATIENT
Start: 2024-01-11

## 2024-01-11 RX ORDER — METFORMIN HYDROCHLORIDE 500 MG/1
1000 TABLET, EXTENDED RELEASE ORAL
Qty: 60 TABLET | Refills: 5 | Status: SHIPPED | OUTPATIENT
Start: 2024-01-11

## 2024-01-11 RX ORDER — GLIMEPIRIDE 2 MG/1
2 TABLET ORAL
Qty: 30 TABLET | Refills: 5 | Status: SHIPPED | OUTPATIENT
Start: 2024-01-11 | End: 2024-01-11

## 2024-01-11 SDOH — ECONOMIC STABILITY: FOOD INSECURITY: WITHIN THE PAST 12 MONTHS, YOU WORRIED THAT YOUR FOOD WOULD RUN OUT BEFORE YOU GOT MONEY TO BUY MORE.: NEVER TRUE

## 2024-01-11 SDOH — ECONOMIC STABILITY: INCOME INSECURITY: HOW HARD IS IT FOR YOU TO PAY FOR THE VERY BASICS LIKE FOOD, HOUSING, MEDICAL CARE, AND HEATING?: NOT HARD AT ALL

## 2024-01-11 SDOH — ECONOMIC STABILITY: FOOD INSECURITY: WITHIN THE PAST 12 MONTHS, THE FOOD YOU BOUGHT JUST DIDN'T LAST AND YOU DIDN'T HAVE MONEY TO GET MORE.: NEVER TRUE

## 2024-01-11 ASSESSMENT — PATIENT HEALTH QUESTIONNAIRE - PHQ9
SUM OF ALL RESPONSES TO PHQ QUESTIONS 1-9: 0
2. FEELING DOWN, DEPRESSED OR HOPELESS: 0
SUM OF ALL RESPONSES TO PHQ9 QUESTIONS 1 & 2: 0
1. LITTLE INTEREST OR PLEASURE IN DOING THINGS: 0
SUM OF ALL RESPONSES TO PHQ QUESTIONS 1-9: 0

## 2024-01-11 NOTE — PROGRESS NOTES
General FM note    Pavan Luong is a 48 y.o. female who presents today for follow up on her  medical conditions as noted below.  Pavan Luong is c/o of   Chief Complaint   Patient presents with    Diabetes       Patient Active Problem List:     Hypertension affecting pregnancy     BMI 39.0-39.9,adult     Hx of gestational diabetes in prior pregnancy, currently pregnant     Hx of  section     History of stillbirth in currently pregnant patient     Past Medical History:   Diagnosis Date    Asthma     exercise induced- not taking inhalers currently    Hypertension     Sleep apnea     diagnosed by dentist does not use a machine    Wellness examination     Dr. Padilla-PCP      Past Surgical History:   Procedure Laterality Date    APPENDECTOMY       SECTION      DILATION AND CURETTAGE      DILATION AND CURETTAGE OF UTERUS  2019    NOVASURE    DILATION AND CURETTAGE OF UTERUS N/A 2019    NOVASURE- DILATATION AND CURETTAGE HYSTEROSCOPY performed by Jesus Harden MD at [facility] OR    HYSTEROSCOPY  2019     Family History   Problem Relation Age of Onset    Cancer Paternal Grandmother     Hypertension Maternal Grandmother     Heart Disease Maternal Grandmother     Hypertension Mother     Heart Disease Mother     Other Father         suicide    High Blood Pressure Sister     Arthritis Sister     High Blood Pressure Maternal Aunt     High Blood Pressure Maternal Uncle     Stroke Paternal Uncle      Current Outpatient Medications   Medication Sig Dispense Refill    metFORMIN (GLUCOPHAGE-XR) 500 MG extended release tablet Take 2 tablets by mouth daily (with breakfast) 60 tablet 5    glimepiride (AMARYL) 1 MG tablet Take 1 tablet by mouth every morning (before breakfast) 30 tablet 5    bisoprolol-hydroCHLOROthiazide (ZIAC) 10-6.25 MG per tablet take 1 tablet by mouth once daily 30 tablet 3    losartan-hydroCHLOROthiazide (HYZAAR) 100-12.5 MG per tablet take 1 tablet by mouth once daily 30

## 2024-03-28 DIAGNOSIS — Z12.31 VISIT FOR SCREENING MAMMOGRAM: ICD-10-CM

## 2024-04-02 RX ORDER — BISOPROLOL FUMARATE AND HYDROCHLOROTHIAZIDE 10; 6.25 MG/1; MG/1
TABLET ORAL
Qty: 30 TABLET | Refills: 3 | Status: SHIPPED | OUTPATIENT
Start: 2024-04-02

## 2024-04-05 RX ORDER — LOSARTAN POTASSIUM AND HYDROCHLOROTHIAZIDE 12.5; 1 MG/1; MG/1
TABLET ORAL
Qty: 30 TABLET | Refills: 3 | Status: SHIPPED | OUTPATIENT
Start: 2024-04-05

## 2024-04-08 ENCOUNTER — HOSPITAL ENCOUNTER (OUTPATIENT)
Age: 49
Setting detail: SPECIMEN
Discharge: HOME | End: 2024-04-08

## 2024-04-08 DIAGNOSIS — E11.9 TYPE 2 DIABETES MELLITUS WITHOUT COMPLICATION, WITHOUT LONG-TERM CURRENT USE OF INSULIN (HCC): ICD-10-CM

## 2024-04-08 LAB
EST. AVERAGE GLUCOSE BLD GHB EST-MCNC: 131 MG/DL
HBA1C MFR BLD: 6.2 % (ref 4–6)

## 2024-04-11 ENCOUNTER — OFFICE VISIT (OUTPATIENT)
Dept: FAMILY MEDICINE CLINIC | Age: 49
End: 2024-04-11
Payer: COMMERCIAL

## 2024-04-11 VITALS
HEIGHT: 62 IN | SYSTOLIC BLOOD PRESSURE: 109 MMHG | HEART RATE: 60 BPM | DIASTOLIC BLOOD PRESSURE: 78 MMHG | BODY MASS INDEX: 40.67 KG/M2 | TEMPERATURE: 97.2 F | WEIGHT: 221 LBS | OXYGEN SATURATION: 99 %

## 2024-04-11 DIAGNOSIS — E11.9 TYPE 2 DIABETES MELLITUS WITHOUT COMPLICATION, WITHOUT LONG-TERM CURRENT USE OF INSULIN (HCC): Primary | ICD-10-CM

## 2024-04-11 PROCEDURE — G8417 CALC BMI ABV UP PARAM F/U: HCPCS | Performed by: FAMILY MEDICINE

## 2024-04-11 PROCEDURE — 3044F HG A1C LEVEL LT 7.0%: CPT | Performed by: FAMILY MEDICINE

## 2024-04-11 PROCEDURE — G8427 DOCREV CUR MEDS BY ELIG CLIN: HCPCS | Performed by: FAMILY MEDICINE

## 2024-04-11 PROCEDURE — 2022F DILAT RTA XM EVC RTNOPTHY: CPT | Performed by: FAMILY MEDICINE

## 2024-04-11 PROCEDURE — 1036F TOBACCO NON-USER: CPT | Performed by: FAMILY MEDICINE

## 2024-04-11 PROCEDURE — 99213 OFFICE O/P EST LOW 20 MIN: CPT | Performed by: FAMILY MEDICINE

## 2024-04-11 ASSESSMENT — PATIENT HEALTH QUESTIONNAIRE - PHQ9
1. LITTLE INTEREST OR PLEASURE IN DOING THINGS: NOT AT ALL
SUM OF ALL RESPONSES TO PHQ QUESTIONS 1-9: 0
SUM OF ALL RESPONSES TO PHQ QUESTIONS 1-9: 0
SUM OF ALL RESPONSES TO PHQ9 QUESTIONS 1 & 2: 0
SUM OF ALL RESPONSES TO PHQ QUESTIONS 1-9: 0
2. FEELING DOWN, DEPRESSED OR HOPELESS: NOT AT ALL
SUM OF ALL RESPONSES TO PHQ QUESTIONS 1-9: 0

## 2024-04-11 NOTE — PROGRESS NOTES
General FM note    Pavan Luong is a 48 y.o. female who presents today for follow up on her  medical conditions as noted below.  Pavan Luong is c/o of   Chief Complaint   Patient presents with    Diabetes    Wrist Injury     Allergic reaction  Left wrist       Patient Active Problem List:     Hypertension affecting pregnancy     BMI 39.0-39.9,adult     Hx of gestational diabetes in prior pregnancy, currently pregnant     Hx of  section     History of stillbirth in currently pregnant patient     Past Medical History:   Diagnosis Date    Asthma     exercise induced- not taking inhalers currently    Hypertension     Sleep apnea     diagnosed by dentist does not use a machine    Wellness examination     Dr. Padilla-PCP      Past Surgical History:   Procedure Laterality Date    APPENDECTOMY       SECTION      DILATION AND CURETTAGE      DILATION AND CURETTAGE OF UTERUS  2019    NOVASURE    DILATION AND CURETTAGE OF UTERUS N/A 2019    NOVASURE- DILATATION AND CURETTAGE HYSTEROSCOPY performed by [facility] Harden MD at [facility] OR    HYSTEROSCOPY  2019     Family History   Problem Relation Age of Onset    Cancer Paternal Grandmother     Hypertension Maternal Grandmother     Heart Disease Maternal Grandmother     Hypertension Mother     Heart Disease Mother     Other Father         suicide    High Blood Pressure Sister     Arthritis Sister     High Blood Pressure Maternal Aunt     High Blood Pressure Maternal Uncle     Stroke Paternal Uncle      Current Outpatient Medications   Medication Sig Dispense Refill    losartan-hydroCHLOROthiazide (HYZAAR) 100-12.5 MG per tablet take 1 tablet by mouth once daily 30 tablet 3    bisoprolol-hydroCHLOROthiazide (ZIAC) 10-6.25 MG per tablet take 1 tablet by mouth once daily 30 tablet 3    metFORMIN (GLUCOPHAGE-XR) 500 MG extended release tablet Take 2 tablets by mouth daily (with breakfast) 60 tablet 5    glimepiride (AMARYL) 1 MG tablet Take 1

## 2024-08-15 DIAGNOSIS — E11.9 TYPE 2 DIABETES MELLITUS WITHOUT COMPLICATION, WITHOUT LONG-TERM CURRENT USE OF INSULIN (HCC): ICD-10-CM

## 2024-08-16 RX ORDER — BISOPROLOL FUMARATE AND HYDROCHLOROTHIAZIDE 10; 6.25 MG/1; MG/1
1 TABLET ORAL DAILY
Qty: 90 TABLET | Refills: 1 | Status: SHIPPED | OUTPATIENT
Start: 2024-08-16

## 2024-08-16 RX ORDER — LOSARTAN POTASSIUM AND HYDROCHLOROTHIAZIDE 12.5; 1 MG/1; MG/1
1 TABLET ORAL DAILY
Qty: 90 TABLET | Refills: 1 | Status: SHIPPED | OUTPATIENT
Start: 2024-08-16

## 2024-08-16 RX ORDER — METFORMIN HYDROCHLORIDE 500 MG/1
500 TABLET, EXTENDED RELEASE ORAL 2 TIMES DAILY
Qty: 180 TABLET | Refills: 1 | Status: SHIPPED | OUTPATIENT
Start: 2024-08-16

## 2024-08-16 RX ORDER — GLIMEPIRIDE 1 MG/1
1 TABLET ORAL
Qty: 90 TABLET | Refills: 1 | Status: SHIPPED | OUTPATIENT
Start: 2024-08-16

## 2025-01-29 ENCOUNTER — OFFICE VISIT (OUTPATIENT)
Dept: FAMILY MEDICINE CLINIC | Age: 50
End: 2025-01-29

## 2025-01-29 ENCOUNTER — HOSPITAL ENCOUNTER (OUTPATIENT)
Age: 50
Setting detail: SPECIMEN
Discharge: HOME | End: 2025-01-29

## 2025-01-29 VITALS
BODY MASS INDEX: 41.48 KG/M2 | TEMPERATURE: 97.2 F | OXYGEN SATURATION: 99 % | WEIGHT: 225.4 LBS | HEIGHT: 62 IN | DIASTOLIC BLOOD PRESSURE: 82 MMHG | SYSTOLIC BLOOD PRESSURE: 125 MMHG | HEART RATE: 75 BPM

## 2025-01-29 DIAGNOSIS — I10 PRIMARY HYPERTENSION: ICD-10-CM

## 2025-01-29 DIAGNOSIS — E11.9 TYPE 2 DIABETES MELLITUS WITHOUT COMPLICATION, WITHOUT LONG-TERM CURRENT USE OF INSULIN (HCC): ICD-10-CM

## 2025-01-29 DIAGNOSIS — Z12.31 ENCOUNTER FOR SCREENING MAMMOGRAM FOR MALIGNANT NEOPLASM OF BREAST: ICD-10-CM

## 2025-01-29 DIAGNOSIS — Z00.01 ENCOUNTER FOR WELL ADULT EXAM WITH ABNORMAL FINDINGS: Primary | ICD-10-CM

## 2025-01-29 DIAGNOSIS — Z23 NEED FOR PROPHYLACTIC VACCINATION WITH TETANUS-DIPHTHERIA (TD): ICD-10-CM

## 2025-01-29 LAB
ALBUMIN SERPL-MCNC: 4.4 G/DL (ref 3.5–5.2)
ALBUMIN/GLOB SERPL: 1.2 {RATIO} (ref 1–2.5)
ALP SERPL-CCNC: 112 U/L (ref 35–104)
ALT SERPL-CCNC: 26 U/L (ref 10–35)
ANION GAP SERPL CALCULATED.3IONS-SCNC: 12 MMOL/L (ref 9–16)
AST SERPL-CCNC: 26 U/L (ref 10–35)
BASOPHILS # BLD: 0.05 K/UL (ref 0–0.2)
BASOPHILS NFR BLD: 1 % (ref 0–2)
BILIRUB SERPL-MCNC: 0.3 MG/DL (ref 0–1.2)
BUN SERPL-MCNC: 14 MG/DL (ref 6–20)
CALCIUM SERPL-MCNC: 9.6 MG/DL (ref 8.6–10.4)
CHLORIDE SERPL-SCNC: 98 MMOL/L (ref 98–107)
CHOLEST SERPL-MCNC: 280 MG/DL (ref 0–199)
CHOLESTEROL/HDL RATIO: 5.5
CO2 SERPL-SCNC: 26 MMOL/L (ref 20–31)
CREAT SERPL-MCNC: 0.9 MG/DL (ref 0.6–0.9)
CREAT UR-MCNC: 344 MG/DL (ref 28–217)
EOSINOPHIL # BLD: 0.05 K/UL (ref 0–0.44)
EOSINOPHILS RELATIVE PERCENT: 1 % (ref 1–4)
ERYTHROCYTE [DISTWIDTH] IN BLOOD BY AUTOMATED COUNT: 13.4 % (ref 11.8–14.4)
GFR, ESTIMATED: 79 ML/MIN/1.73M2
GLUCOSE SERPL-MCNC: 140 MG/DL (ref 74–99)
HBA1C MFR BLD: 7.3 %
HCT VFR BLD AUTO: 43.3 % (ref 36.3–47.1)
HDLC SERPL-MCNC: 51 MG/DL
HGB BLD-MCNC: 14.5 G/DL (ref 11.9–15.1)
IMM GRANULOCYTES # BLD AUTO: <0.03 K/UL (ref 0–0.3)
IMM GRANULOCYTES NFR BLD: 0 %
LDLC SERPL CALC-MCNC: ABNORMAL MG/DL (ref 0–100)
LDLC SERPL DIRECT ASSAY-MCNC: 155 MG/DL
LYMPHOCYTES NFR BLD: 2.55 K/UL (ref 1.1–3.7)
LYMPHOCYTES RELATIVE PERCENT: 38 % (ref 24–43)
MCH RBC QN AUTO: 32.7 PG (ref 25.2–33.5)
MCHC RBC AUTO-ENTMCNC: 33.5 G/DL (ref 28.4–34.8)
MCV RBC AUTO: 97.7 FL (ref 82.6–102.9)
MICROALBUMIN UR-MCNC: 90 MG/L (ref 0–20)
MICROALBUMIN/CREAT UR-RTO: 26 MCG/MG CREAT (ref 0–25)
MONOCYTES NFR BLD: 0.41 K/UL (ref 0.1–1.2)
MONOCYTES NFR BLD: 6 % (ref 3–12)
NEUTROPHILS NFR BLD: 54 % (ref 36–65)
NEUTS SEG NFR BLD: 3.57 K/UL (ref 1.5–8.1)
NRBC BLD-RTO: 0 PER 100 WBC
PLATELET # BLD AUTO: 337 K/UL (ref 138–453)
PMV BLD AUTO: 10.3 FL (ref 8.1–13.5)
POTASSIUM SERPL-SCNC: 4.1 MMOL/L (ref 3.7–5.3)
PROT SERPL-MCNC: 8.2 G/DL (ref 6.6–8.7)
RBC # BLD AUTO: 4.43 M/UL (ref 3.95–5.11)
SODIUM SERPL-SCNC: 136 MMOL/L (ref 136–145)
TRIGL SERPL-MCNC: 529 MG/DL
TSH SERPL DL<=0.05 MIU/L-ACNC: 1.61 UIU/ML (ref 0.27–4.2)
VLDLC SERPL CALC-MCNC: ABNORMAL MG/DL (ref 1–30)
WBC OTHER # BLD: 6.7 K/UL (ref 3.5–11.3)

## 2025-01-29 RX ORDER — BISOPROLOL FUMARATE AND HYDROCHLOROTHIAZIDE 10; 6.25 MG/1; MG/1
1 TABLET ORAL DAILY
Qty: 90 TABLET | Refills: 1 | Status: SHIPPED | OUTPATIENT
Start: 2025-01-29

## 2025-01-29 RX ORDER — LOSARTAN POTASSIUM AND HYDROCHLOROTHIAZIDE 12.5; 1 MG/1; MG/1
1 TABLET ORAL DAILY
Qty: 90 TABLET | Refills: 1 | Status: SHIPPED | OUTPATIENT
Start: 2025-01-29

## 2025-01-29 RX ORDER — GLIMEPIRIDE 1 MG/1
1 TABLET ORAL
Qty: 90 TABLET | Refills: 1 | Status: SHIPPED | OUTPATIENT
Start: 2025-01-29

## 2025-01-29 SDOH — ECONOMIC STABILITY: FOOD INSECURITY: WITHIN THE PAST 12 MONTHS, THE FOOD YOU BOUGHT JUST DIDN'T LAST AND YOU DIDN'T HAVE MONEY TO GET MORE.: PATIENT DECLINED

## 2025-01-29 SDOH — ECONOMIC STABILITY: FOOD INSECURITY: WITHIN THE PAST 12 MONTHS, YOU WORRIED THAT YOUR FOOD WOULD RUN OUT BEFORE YOU GOT MONEY TO BUY MORE.: PATIENT DECLINED

## 2025-01-29 ASSESSMENT — PATIENT HEALTH QUESTIONNAIRE - PHQ9
SUM OF ALL RESPONSES TO PHQ QUESTIONS 1-9: 1
SUM OF ALL RESPONSES TO PHQ9 QUESTIONS 1 & 2: 1
SUM OF ALL RESPONSES TO PHQ QUESTIONS 1-9: 1
1. LITTLE INTEREST OR PLEASURE IN DOING THINGS: NOT AT ALL
SUM OF ALL RESPONSES TO PHQ QUESTIONS 1-9: 1
2. FEELING DOWN, DEPRESSED OR HOPELESS: SEVERAL DAYS
SUM OF ALL RESPONSES TO PHQ QUESTIONS 1-9: 1

## 2025-01-29 NOTE — PATIENT INSTRUCTIONS
bypass can be performed as open surgery (through an incision on the abdomen) or laparoscopically, which uses smaller incisions and smaller instruments. Both the laparoscopic and open techniques have risks and benefits. You and your surgeon should work together to decide which surgery, if any, is right for you.  Gastric bypass has a high success rate, and people lose an average of 62 to 68 percent of their excess body weight in the first year. Weight loss typically levels off after one to two years, with an overall excess weight loss between 50 and 75 percent. For a person who is 120 pounds overweight, an average of 60 to 90 pounds of weight loss would be expected.  Gastric sleeve -- Gastric sleeve, also known as sleeve gastrectomy, is a surgery that reduces the size of the stomach and makes it into a narrow tube (figure 3). The new stomach is much smaller and produces less of the hormone (ghrelin) that causes hunger, helping you feel satisfied with less food.  Sleeve gastrectomy is safer than gastric bypass because the intestines are not rearranged, and there is less chance of malnutrition. It also appears to control hunger better than lap banding. It might be safer than the lap banding because no foreign materials are used.  The gastric sleeve has a good success rate, and people lose an average of 33 percent of their excess body weight in the first year. For a person who is 120 pounds overweight, this would mean losing about 40 pounds in the first year.  WEIGHT LOSS SURGERY COMPLICATIONS -- A variety of complications can occur with weight loss surgery. The risks of surgery depend upon which surgery you have and any medical problems you had before surgery. Some of the more common early surgical complications (one to six weeks after surgery) include:  Bleeding   Infection   Blockage or tear in the bowels   Need for further surgery  Important medical complications after surgery can include blood clots in the legs or

## 2025-01-29 NOTE — PROGRESS NOTES
Well Adult Note  Name: Pavan Luong Today’s Date: 2025   MRN: [id number] Sex: Female   Age: 49 y.o. Ethnicity: Non- / Non    : 1975 Race: Black /       Pavan Luong is here for a well adult exam.       Assessment & Plan   Encounter for well adult exam with abnormal findings  Type 2 diabetes mellitus without complication, without long-term current use of insulin (HCC)  -     POCT glycosylated hemoglobin (Hb A1C)  -     glimepiride (AMARYL) 1 MG tablet; Take 1 tablet by mouth every morning (before breakfast), Disp-90 tablet, R-1Normal  -     empagliflozin (JARDIANCE) 25 MG tablet; Take 1 tablet by mouth daily, Disp-90 tablet, R-1Normal  -     CBC with Auto Differential; Future  -     Comprehensive Metabolic Panel; Future  -     Lipid Panel; Future  -     TSH reflex to FT4; Future  -     Albumin/Creatinine Ratio, Urine; Future  Primary hypertension  -     CBC with Auto Differential; Future  -     Comprehensive Metabolic Panel; Future  -     Lipid Panel; Future  -     TSH reflex to FT4; Future  -     Albumin/Creatinine Ratio, Urine; Future  Encounter for screening mammogram for malignant neoplasm of breast  -     [facility] LUZMARIA DIGITAL SCREEN BILATERAL; Future  Need for prophylactic vaccination with tetanus-diphtheria (Td)  -     Tdap (ADACEL) 5-2-15.5 LF-MCG/0.5 injection; Inject 0.5 mLs into the muscle once for 1 dose, Disp-0.5 mL, R-0Print     Overall the patient is doing quite well.  But her A1c is up as she has not been taking tolerating the metformin well as while she has not been taking glimepiride.  Discussed with her that she needs to come back for sure in 3 months to make sure A1c is coming down.  Will start on Jardiance and she will continue glimepiride.  Discussed with her vaccines.  Call or return to clinic prn if these symptoms worsen or fail to improve as anticipated.  I have reviewed the instructions with the patient, answering all questions to her

## 2025-02-04 DIAGNOSIS — E11.9 TYPE 2 DIABETES MELLITUS WITHOUT COMPLICATION, WITHOUT LONG-TERM CURRENT USE OF INSULIN (HCC): ICD-10-CM

## 2025-02-04 NOTE — TELEPHONE ENCOUNTER
Patient is requesting a new prescription for pended medication be sent to updated pharmacy due to insurance reasons      Pavan Luong is calling to request a refill on the following medication(s):    Last Visit Date (If Applicable):  1/29/2025    Next Visit Date:    4/28/2025    Medication Request:  Requested Prescriptions     Pending Prescriptions Disp Refills    empagliflozin (JARDIANCE) 25 MG tablet 90 tablet 1     Sig: Take 1 tablet by mouth daily

## 2025-03-10 NOTE — PROGRESS NOTES
[facility]  [address] [place], [place] [address]  [address]  [facility] [address]  Dept: [phone]      3/10/25    Pavan Luong     Gyn Annual Exam      CHIEF COMPLAINT:    Chief Complaint   Patient presents with    Annual Exam     Last pap 23 WNL last mammogram 3/23/24 WNL               Blood pressure 107/76, height 1.575 m (5' 2\"), weight 103.8 kg (228 lb 12.8 oz), unknown if currently breastfeeding.           HPI :     Pavan Luong 1975 is a 49 y.o.   who is here for her annual exam.  She has had an ablation and is amenorrheic  Hx mixed incontinence- not problematic for her  Last pap 2023 NILM    Two sons   _____________________________________________________________________  Past Medical History:   Diagnosis Date    Asthma     exercise induced- not taking inhalers currently    Hypertension     Sleep apnea     diagnosed by dentist does not use a machine    Wellness examination     Dr. Padilla-PCP                                                                   Past Surgical History:   Procedure Laterality Date    APPENDECTOMY       SECTION      DILATION AND CURETTAGE      DILATION AND CURETTAGE OF UTERUS  2019    NOVASURE    DILATION AND CURETTAGE OF UTERUS N/A 2019    NOVASURE- DILATATION AND CURETTAGE HYSTEROSCOPY performed by Jesus Harden MD at [facility] OR    HYSTEROSCOPY  2019     Family History   Problem Relation Age of Onset    Cancer Paternal Grandmother     Hypertension Maternal Grandmother     Heart Disease Maternal Grandmother     Hypertension Mother     Heart Disease Mother     Other Father         suicide    High Blood Pressure Sister     Arthritis Sister     High Blood Pressure Maternal Aunt     High Blood Pressure Maternal Uncle     Stroke Paternal Uncle      Social History     Tobacco Use   Smoking Status Former    Current packs/day: 0.50    Average packs/day: 0.5

## 2025-03-11 ENCOUNTER — OFFICE VISIT (OUTPATIENT)
Dept: OBGYN CLINIC | Age: 50
End: 2025-03-11
Payer: COMMERCIAL

## 2025-03-11 VITALS
WEIGHT: 228.8 LBS | SYSTOLIC BLOOD PRESSURE: 107 MMHG | HEIGHT: 62 IN | BODY MASS INDEX: 42.1 KG/M2 | DIASTOLIC BLOOD PRESSURE: 76 MMHG

## 2025-03-11 DIAGNOSIS — Z01.419 ENCOUNTER FOR GYNECOLOGICAL EXAMINATION: Primary | ICD-10-CM

## 2025-03-11 PROCEDURE — 99396 PREV VISIT EST AGE 40-64: CPT | Performed by: NURSE PRACTITIONER

## 2025-03-11 ASSESSMENT — ENCOUNTER SYMPTOMS
ABDOMINAL DISTENTION: 0
ALLERGIC/IMMUNOLOGIC NEGATIVE: 1
GASTROINTESTINAL NEGATIVE: 1
COUGH: 0
SHORTNESS OF BREATH: 0
RESPIRATORY NEGATIVE: 1
BACK PAIN: 0

## 2025-03-31 RX ORDER — BISOPROLOL FUMARATE AND HYDROCHLOROTHIAZIDE 10; 6.25 MG/1; MG/1
1 TABLET ORAL DAILY
Qty: 90 TABLET | Refills: 3 | Status: SHIPPED | OUTPATIENT
Start: 2025-03-31

## 2025-03-31 RX ORDER — LOSARTAN POTASSIUM AND HYDROCHLOROTHIAZIDE 12.5; 1 MG/1; MG/1
1 TABLET ORAL DAILY
Qty: 90 TABLET | Refills: 3 | Status: SHIPPED | OUTPATIENT
Start: 2025-03-31

## 2025-04-10 DIAGNOSIS — E11.9 TYPE 2 DIABETES MELLITUS WITHOUT COMPLICATION, WITHOUT LONG-TERM CURRENT USE OF INSULIN: ICD-10-CM

## 2025-04-10 NOTE — TELEPHONE ENCOUNTER
.  Pavan Luong is calling to request a refill on the following medication(s):    Last Visit Date (If Applicable):  1/29/2025    Next Visit Date:    4/28/2025    Medication Request:  Requested Prescriptions     Pending Prescriptions Disp Refills    empagliflozin (JARDIANCE) 25 MG tablet 90 tablet 1     Sig: Take 1 tablet by mouth daily

## 2025-04-28 ENCOUNTER — OFFICE VISIT (OUTPATIENT)
Dept: FAMILY MEDICINE CLINIC | Age: 50
End: 2025-04-28
Payer: COMMERCIAL

## 2025-04-28 VITALS
SYSTOLIC BLOOD PRESSURE: 129 MMHG | HEART RATE: 71 BPM | BODY MASS INDEX: 41.74 KG/M2 | DIASTOLIC BLOOD PRESSURE: 88 MMHG | OXYGEN SATURATION: 99 % | TEMPERATURE: 97.3 F | WEIGHT: 228.2 LBS

## 2025-04-28 DIAGNOSIS — E78.1 HYPERTRIGLYCERIDEMIA: ICD-10-CM

## 2025-04-28 DIAGNOSIS — E11.69 TYPE 2 DIABETES MELLITUS WITH HYPERCHOLESTEROLEMIA (HCC): ICD-10-CM

## 2025-04-28 DIAGNOSIS — E78.00 TYPE 2 DIABETES MELLITUS WITH HYPERCHOLESTEROLEMIA (HCC): ICD-10-CM

## 2025-04-28 DIAGNOSIS — E11.9 TYPE 2 DIABETES MELLITUS WITHOUT COMPLICATION, WITHOUT LONG-TERM CURRENT USE OF INSULIN (HCC): Primary | ICD-10-CM

## 2025-04-28 LAB — HBA1C MFR BLD: 7 %

## 2025-04-28 PROCEDURE — G8417 CALC BMI ABV UP PARAM F/U: HCPCS | Performed by: FAMILY MEDICINE

## 2025-04-28 PROCEDURE — 83036 HEMOGLOBIN GLYCOSYLATED A1C: CPT | Performed by: FAMILY MEDICINE

## 2025-04-28 PROCEDURE — 2022F DILAT RTA XM EVC RTNOPTHY: CPT | Performed by: FAMILY MEDICINE

## 2025-04-28 PROCEDURE — 3051F HG A1C>EQUAL 7.0%<8.0%: CPT | Performed by: FAMILY MEDICINE

## 2025-04-28 PROCEDURE — 99214 OFFICE O/P EST MOD 30 MIN: CPT | Performed by: FAMILY MEDICINE

## 2025-04-28 PROCEDURE — G8427 DOCREV CUR MEDS BY ELIG CLIN: HCPCS | Performed by: FAMILY MEDICINE

## 2025-04-28 PROCEDURE — 1036F TOBACCO NON-USER: CPT | Performed by: FAMILY MEDICINE

## 2025-04-28 RX ORDER — ROSUVASTATIN CALCIUM 40 MG/1
40 TABLET, COATED ORAL DAILY
Qty: 90 TABLET | Refills: 1 | Status: SHIPPED | OUTPATIENT
Start: 2025-04-28

## 2025-04-28 ASSESSMENT — PATIENT HEALTH QUESTIONNAIRE - PHQ9
2. FEELING DOWN, DEPRESSED OR HOPELESS: SEVERAL DAYS
SUM OF ALL RESPONSES TO PHQ QUESTIONS 1-9: 1
SUM OF ALL RESPONSES TO PHQ QUESTIONS 1-9: 1
1. LITTLE INTEREST OR PLEASURE IN DOING THINGS: NOT AT ALL
SUM OF ALL RESPONSES TO PHQ QUESTIONS 1-9: 1
SUM OF ALL RESPONSES TO PHQ QUESTIONS 1-9: 1

## 2025-04-28 NOTE — PROGRESS NOTES
General FM note    Pavan Luong is a 49 y.o. female who presents today for follow up on her  medical conditions as noted below.  Pavan Luong is c/o of   Chief Complaint   Patient presents with    Diabetes       Patient Active Problem List:     Hypertension affecting pregnancy     BMI 39.0-39.9,adult     Hx of gestational diabetes in prior pregnancy, currently pregnant     Hx of  section     History of stillbirth in currently pregnant patient     Past Medical History:   Diagnosis Date    Asthma     exercise induced- not taking inhalers currently    Hypertension     Sleep apnea     diagnosed by dentist does not use a machine    Wellness examination     Dr. Padilla-PCP      Past Surgical History:   Procedure Laterality Date    APPENDECTOMY       SECTION      DILATION AND CURETTAGE      DILATION AND CURETTAGE OF UTERUS  2019    NOVASURE    DILATION AND CURETTAGE OF UTERUS N/A 2019    NOVASURE- DILATATION AND CURETTAGE HYSTEROSCOPY performed by [facility] Harden MD at [facility] OR    HYSTEROSCOPY  2019     Family History   Problem Relation Age of Onset    Cancer Paternal Grandmother     Hypertension Maternal Grandmother     Heart Disease Maternal Grandmother     Hypertension Mother     Heart Disease Mother     Other Father         suicide    High Blood Pressure Sister     Arthritis Sister     High Blood Pressure Maternal Aunt     High Blood Pressure Maternal Uncle     Stroke Paternal Uncle      Current Outpatient Medications   Medication Sig Dispense Refill    rosuvastatin (CRESTOR) 40 MG tablet Take 1 tablet by mouth daily 90 tablet 1    empagliflozin (JARDIANCE) 25 MG tablet Take 1 tablet by mouth daily 90 tablet 1    bisoprolol-hydroCHLOROthiazide (ZIAC) 10-6.25 MG per tablet TAKE 1 TABLET DAILY 90 tablet 3    losartan-hydroCHLOROthiazide (HYZAAR) 100-12.5 MG per tablet TAKE 1 TABLET DAILY 90 tablet 3    glimepiride (AMARYL) 1 MG tablet Take 1 tablet by mouth every morning

## 2025-07-28 ENCOUNTER — HOSPITAL ENCOUNTER (OUTPATIENT)
Age: 50
Setting detail: SPECIMEN
Discharge: HOME | End: 2025-07-28

## 2025-07-28 ENCOUNTER — OFFICE VISIT (OUTPATIENT)
Dept: FAMILY MEDICINE CLINIC | Age: 50
End: 2025-07-28
Payer: COMMERCIAL

## 2025-07-28 VITALS
HEART RATE: 80 BPM | OXYGEN SATURATION: 99 % | SYSTOLIC BLOOD PRESSURE: 108 MMHG | WEIGHT: 224 LBS | BODY MASS INDEX: 40.97 KG/M2 | DIASTOLIC BLOOD PRESSURE: 77 MMHG | TEMPERATURE: 97 F

## 2025-07-28 DIAGNOSIS — R31.1 BENIGN ESSENTIAL MICROSCOPIC HEMATURIA: ICD-10-CM

## 2025-07-28 DIAGNOSIS — E11.9 TYPE 2 DIABETES MELLITUS WITHOUT COMPLICATION, WITHOUT LONG-TERM CURRENT USE OF INSULIN (HCC): Primary | ICD-10-CM

## 2025-07-28 DIAGNOSIS — E11.9 TYPE 2 DIABETES MELLITUS WITHOUT COMPLICATION, WITHOUT LONG-TERM CURRENT USE OF INSULIN (HCC): ICD-10-CM

## 2025-07-28 LAB
BACTERIA URNS QL MICRO: ABNORMAL
BILIRUB UR QL STRIP: NEGATIVE
CASTS #/AREA URNS LPF: ABNORMAL /LPF (ref 0–8)
CLARITY UR: ABNORMAL
COLOR UR: YELLOW
EPI CELLS #/AREA URNS HPF: ABNORMAL /HPF (ref 0–5)
GLUCOSE UR STRIP-MCNC: NEGATIVE MG/DL
HBA1C MFR BLD: 6.8 %
HGB UR QL STRIP.AUTO: ABNORMAL
KETONES UR STRIP-MCNC: NEGATIVE MG/DL
LEUKOCYTE ESTERASE UR QL STRIP: ABNORMAL
NITRITE UR QL STRIP: POSITIVE
PH UR STRIP: 5.5 [PH] (ref 5–8)
PROT UR STRIP-MCNC: ABNORMAL MG/DL
RBC #/AREA URNS HPF: ABNORMAL /HPF (ref 0–4)
SP GR UR STRIP: 1.02 (ref 1–1.03)
UROBILINOGEN UR STRIP-ACNC: NORMAL EU/DL (ref 0–1)
WBC #/AREA URNS HPF: ABNORMAL /HPF (ref 0–5)

## 2025-07-28 PROCEDURE — G8417 CALC BMI ABV UP PARAM F/U: HCPCS | Performed by: FAMILY MEDICINE

## 2025-07-28 PROCEDURE — 99214 OFFICE O/P EST MOD 30 MIN: CPT | Performed by: FAMILY MEDICINE

## 2025-07-28 PROCEDURE — 2022F DILAT RTA XM EVC RTNOPTHY: CPT | Performed by: FAMILY MEDICINE

## 2025-07-28 PROCEDURE — G8427 DOCREV CUR MEDS BY ELIG CLIN: HCPCS | Performed by: FAMILY MEDICINE

## 2025-07-28 PROCEDURE — 3044F HG A1C LEVEL LT 7.0%: CPT | Performed by: FAMILY MEDICINE

## 2025-07-28 PROCEDURE — 1036F TOBACCO NON-USER: CPT | Performed by: FAMILY MEDICINE

## 2025-07-28 PROCEDURE — 83036 HEMOGLOBIN GLYCOSYLATED A1C: CPT | Performed by: FAMILY MEDICINE

## 2025-07-28 RX ORDER — GLIMEPIRIDE 2 MG/1
2 TABLET ORAL EVERY MORNING
Qty: 90 TABLET | Refills: 1 | Status: SHIPPED | OUTPATIENT
Start: 2025-07-28

## 2025-07-28 ASSESSMENT — PATIENT HEALTH QUESTIONNAIRE - PHQ9
SUM OF ALL RESPONSES TO PHQ QUESTIONS 1-9: 0
1. LITTLE INTEREST OR PLEASURE IN DOING THINGS: NOT AT ALL
2. FEELING DOWN, DEPRESSED OR HOPELESS: NOT AT ALL
SUM OF ALL RESPONSES TO PHQ QUESTIONS 1-9: 0

## 2025-07-28 NOTE — PROGRESS NOTES
General FM note    Pavan Luong is a 50 y.o. female who presents today for follow up on her  medical conditions as noted below.  Pavan Luong is c/o of   Chief Complaint   Patient presents with    Diabetes       Patient Active Problem List:     Hypertension affecting pregnancy     BMI 39.0-39.9,adult     Hx of gestational diabetes in prior pregnancy, currently pregnant     Hx of  section     History of stillbirth in currently pregnant patient     Past Medical History:   Diagnosis Date    Asthma     exercise induced- not taking inhalers currently    Hypertension     Sleep apnea     diagnosed by dentist does not use a machine    Wellness examination     Dr. Padilla-PCP      Past Surgical History:   Procedure Laterality Date    APPENDECTOMY       SECTION      DILATION AND CURETTAGE      DILATION AND CURETTAGE OF UTERUS  2019    NOVASURE    DILATION AND CURETTAGE OF UTERUS N/A 2019    NOVASURE- DILATATION AND CURETTAGE HYSTEROSCOPY performed by [facility] Harden MD at [facility] OR    HYSTEROSCOPY  2019     Family History   Problem Relation Age of Onset    Cancer Paternal Grandmother     Hypertension Maternal Grandmother     Heart Disease Maternal Grandmother     Hypertension Mother     Heart Disease Mother     Other Father         suicide    High Blood Pressure Sister     Arthritis Sister     High Blood Pressure Maternal Aunt     High Blood Pressure Maternal Uncle     Stroke Paternal Uncle      Current Outpatient Medications   Medication Sig Dispense Refill    glimepiride (AMARYL) 2 MG tablet Take 1 tablet by mouth every morning 90 tablet 1    rosuvastatin (CRESTOR) 40 MG tablet Take 1 tablet by mouth daily 90 tablet 1    empagliflozin (JARDIANCE) 25 MG tablet Take 1 tablet by mouth daily 90 tablet 1    bisoprolol-hydroCHLOROthiazide (ZIAC) 10-6.25 MG per tablet TAKE 1 TABLET DAILY 90 tablet 3    losartan-hydroCHLOROthiazide (HYZAAR) 100-12.5 MG per tablet TAKE 1 TABLET DAILY 90

## 2025-07-30 DIAGNOSIS — R82.90 ABNORMAL URINALYSIS: Primary | ICD-10-CM

## 2025-07-31 ENCOUNTER — HOSPITAL ENCOUNTER (OUTPATIENT)
Dept: ULTRASOUND IMAGING | Age: 50
Discharge: HOME | End: 2025-08-02
Payer: COMMERCIAL

## 2025-07-31 DIAGNOSIS — R31.1 BENIGN ESSENTIAL MICROSCOPIC HEMATURIA: ICD-10-CM

## 2025-07-31 DIAGNOSIS — E11.9 TYPE 2 DIABETES MELLITUS WITHOUT COMPLICATION, WITHOUT LONG-TERM CURRENT USE OF INSULIN (HCC): ICD-10-CM

## 2025-07-31 PROCEDURE — 76775 US EXAM ABDO BACK WALL LIM: CPT

## (undated) DEVICE — SVMMC GYN MIN PK

## (undated) DEVICE — GLOVE ORANGE PI 7 1/2   MSG9075

## (undated) DEVICE — KIT THERMOABLATION 6MM ENDOMET DEV NOVASURE

## (undated) DEVICE — FLUID MGMT SYS FLUENT KIT 6/PK

## (undated) DEVICE — SET ENDOSCP SEAL HYSTEROSCOPE RIG OUTFLO CHN DISP MYOSURE

## (undated) DEVICE — SOLUTION SCRB 4OZ 4% CHG H2O AIDED FOR PREOPERATIVE SKIN

## (undated) DEVICE — GLOVE SURG SZ 65 THK91MIL LTX FREE SYN POLYISOPRENE

## (undated) DEVICE — DRAPE,REIN 53X77,STERILE: Brand: MEDLINE

## (undated) DEVICE — TUBING, SUCTION, 9/32" X 20', STRAIGHT: Brand: MEDLINE INDUSTRIES, INC.

## (undated) DEVICE — BAG,LEG,COMFORT-STRAPS,MEDIUM,20OZ: Brand: MEDLINE

## (undated) DEVICE — GARMENT,MEDLINE,DVT,INT,CALF,MED, GEN2: Brand: MEDLINE

## (undated) DEVICE — 1016 S-DRAPE IRRIG POUCH 10/BOX: Brand: STERI-DRAPE™

## (undated) DEVICE — CYSTO/BLADDER IRRIGATION SET, REGULATING CLAMP